# Patient Record
Sex: FEMALE | Race: WHITE | NOT HISPANIC OR LATINO | Employment: FULL TIME | URBAN - METROPOLITAN AREA
[De-identification: names, ages, dates, MRNs, and addresses within clinical notes are randomized per-mention and may not be internally consistent; named-entity substitution may affect disease eponyms.]

---

## 2019-06-24 ENCOUNTER — HOSPITAL ENCOUNTER (OUTPATIENT)
Dept: OTHER | Facility: HOSPITAL | Age: 44
Discharge: HOME | End: 2019-06-24
Attending: EMERGENCY MEDICINE

## 2019-06-24 LAB — CONV HIV-1/ HIV-2: NEGATIVE

## 2019-08-06 ENCOUNTER — HOSPITAL ENCOUNTER (OUTPATIENT)
Dept: OTHER | Facility: HOSPITAL | Age: 44
Discharge: HOME | End: 2019-08-06
Attending: EMERGENCY MEDICINE

## 2019-08-06 LAB — CONV HIV-1/ HIV-2: NEGATIVE

## 2019-09-13 ENCOUNTER — HOSPITAL ENCOUNTER (OUTPATIENT)
Dept: OTHER | Facility: HOSPITAL | Age: 44
Discharge: HOME | End: 2019-09-13

## 2019-09-13 ENCOUNTER — OFFICE VISIT CONVERTED (OUTPATIENT)
Dept: FAMILY MEDICINE CLINIC | Age: 44
End: 2019-09-13
Attending: NURSE PRACTITIONER

## 2019-09-13 LAB
ALBUMIN SERPL-MCNC: 4.4 G/DL (ref 3.5–5)
ALBUMIN/GLOB SERPL: 1.6 {RATIO} (ref 1.4–2.6)
ALP SERPL-CCNC: 96 U/L (ref 42–98)
ALT SERPL-CCNC: 10 U/L (ref 10–40)
ANION GAP SERPL CALC-SCNC: 17 MMOL/L (ref 8–19)
AST SERPL-CCNC: 13 U/L (ref 15–50)
BASOPHILS # BLD AUTO: 0.05 10*3/UL (ref 0–0.2)
BASOPHILS NFR BLD AUTO: 0.7 % (ref 0–3)
BILIRUB SERPL-MCNC: 0.25 MG/DL (ref 0.2–1.3)
BUN SERPL-MCNC: 7 MG/DL (ref 5–25)
BUN/CREAT SERPL: 8 {RATIO} (ref 6–20)
CALCIUM SERPL-MCNC: 8.7 MG/DL (ref 8.7–10.4)
CHLORIDE SERPL-SCNC: 105 MMOL/L (ref 99–111)
CHOLEST SERPL-MCNC: 100 MG/DL (ref 107–200)
CHOLEST/HDLC SERPL: 2.7 {RATIO} (ref 3–6)
CONV ABS IMM GRAN: 0.02 10*3/UL (ref 0–0.2)
CONV CO2: 23 MMOL/L (ref 22–32)
CONV IMMATURE GRAN: 0.3 % (ref 0–1.8)
CONV TOTAL PROTEIN: 7.2 G/DL (ref 6.3–8.2)
CREAT UR-MCNC: 0.85 MG/DL (ref 0.5–0.9)
DEPRECATED RDW RBC AUTO: 45.8 FL (ref 36.4–46.3)
EOSINOPHIL # BLD AUTO: 0.04 10*3/UL (ref 0–0.7)
EOSINOPHIL # BLD AUTO: 0.5 % (ref 0–7)
ERYTHROCYTE [DISTWIDTH] IN BLOOD BY AUTOMATED COUNT: 13.4 % (ref 11.7–14.4)
GFR SERPLBLD BASED ON 1.73 SQ M-ARVRAT: >60 ML/MIN/{1.73_M2}
GLOBULIN UR ELPH-MCNC: 2.8 G/DL (ref 2–3.5)
GLUCOSE SERPL-MCNC: 102 MG/DL (ref 65–99)
HCT VFR BLD AUTO: 41 % (ref 37–47)
HDLC SERPL-MCNC: 37 MG/DL (ref 40–60)
HGB BLD-MCNC: 13.9 G/DL (ref 12–16)
LDLC SERPL CALC-MCNC: 48 MG/DL (ref 70–100)
LYMPHOCYTES # BLD AUTO: 2.6 10*3/UL (ref 1–5)
LYMPHOCYTES NFR BLD AUTO: 35.7 % (ref 20–45)
MCH RBC QN AUTO: 31.8 PG (ref 27–31)
MCHC RBC AUTO-ENTMCNC: 33.9 G/DL (ref 33–37)
MCV RBC AUTO: 93.8 FL (ref 81–99)
MONOCYTES # BLD AUTO: 0.43 10*3/UL (ref 0.2–1.2)
MONOCYTES NFR BLD AUTO: 5.9 % (ref 3–10)
NEUTROPHILS # BLD AUTO: 4.15 10*3/UL (ref 2–8)
NEUTROPHILS NFR BLD AUTO: 56.9 % (ref 30–85)
NRBC CBCN: 0 % (ref 0–0.7)
OSMOLALITY SERPL CALC.SUM OF ELEC: 288 MOSM/KG (ref 273–304)
PLATELET # BLD AUTO: 336 10*3/UL (ref 130–400)
PMV BLD AUTO: 10.3 FL (ref 9.4–12.3)
POTASSIUM SERPL-SCNC: 4.9 MMOL/L (ref 3.5–5.3)
RBC # BLD AUTO: 4.37 10*6/UL (ref 4.2–5.4)
SODIUM SERPL-SCNC: 140 MMOL/L (ref 135–147)
T4 FREE SERPL-MCNC: 1.3 NG/DL (ref 0.9–1.8)
TRIGL SERPL-MCNC: 74 MG/DL (ref 40–150)
TSH SERPL-ACNC: 1.76 M[IU]/L (ref 0.27–4.2)
VLDLC SERPL-MCNC: 15 MG/DL (ref 5–37)
WBC # BLD AUTO: 7.29 10*3/UL (ref 4.8–10.8)

## 2019-11-12 ENCOUNTER — HOSPITAL ENCOUNTER (OUTPATIENT)
Dept: OTHER | Facility: HOSPITAL | Age: 44
Discharge: HOME | End: 2019-11-12
Attending: EMERGENCY MEDICINE

## 2019-11-14 LAB
CONV HIV COMBO AG/AB (HIV-1/O/2) WITH REFLEX: NEGATIVE
HCV AB SER DONR QL: <0.1 S/CO RATIO (ref 0–0.9)

## 2019-12-27 ENCOUNTER — OFFICE VISIT CONVERTED (OUTPATIENT)
Dept: FAMILY MEDICINE CLINIC | Age: 44
End: 2019-12-27
Attending: FAMILY MEDICINE

## 2020-02-13 ENCOUNTER — OFFICE VISIT CONVERTED (OUTPATIENT)
Dept: FAMILY MEDICINE CLINIC | Age: 45
End: 2020-02-13
Attending: NURSE PRACTITIONER

## 2020-02-13 ENCOUNTER — HOSPITAL ENCOUNTER (OUTPATIENT)
Dept: OTHER | Facility: HOSPITAL | Age: 45
Discharge: HOME | End: 2020-02-13

## 2020-02-13 LAB
ALBUMIN SERPL-MCNC: 4.3 G/DL (ref 3.5–5)
ALBUMIN/GLOB SERPL: 1.6 {RATIO} (ref 1.4–2.6)
ALP SERPL-CCNC: 106 U/L (ref 42–98)
ALT SERPL-CCNC: 22 U/L (ref 10–40)
AMYLASE SERPL-CCNC: 37 U/L (ref 30–110)
ANION GAP SERPL CALC-SCNC: 16 MMOL/L (ref 8–19)
APPEARANCE UR: CLEAR
AST SERPL-CCNC: 21 U/L (ref 15–50)
BASOPHILS # BLD AUTO: 0.06 10*3/UL (ref 0–0.2)
BASOPHILS NFR BLD AUTO: 0.9 % (ref 0–3)
BILIRUB SERPL-MCNC: 0.27 MG/DL (ref 0.2–1.3)
BILIRUB UR QL: NEGATIVE
BUN SERPL-MCNC: 9 MG/DL (ref 5–25)
BUN/CREAT SERPL: 12 {RATIO} (ref 6–20)
CALCIUM SERPL-MCNC: 9.2 MG/DL (ref 8.7–10.4)
CHLORIDE SERPL-SCNC: 101 MMOL/L (ref 99–111)
COLOR UR: YELLOW
CONV ABS IMM GRAN: 0.01 10*3/UL (ref 0–0.2)
CONV BACTERIA: ABNORMAL
CONV CO2: 24 MMOL/L (ref 22–32)
CONV COLLECTION SOURCE (UA): ABNORMAL
CONV HYALINE CASTS IN URINE MICRO: ABNORMAL /[LPF]
CONV IMMATURE GRAN: 0.1 % (ref 0–1.8)
CONV TOTAL PROTEIN: 7 G/DL (ref 6.3–8.2)
CONV UROBILINOGEN IN URINE BY AUTOMATED TEST STRIP: 0.2 {EHRLICHU}/DL (ref 0.1–1)
CREAT UR-MCNC: 0.76 MG/DL (ref 0.5–0.9)
DEPRECATED RDW RBC AUTO: 43.1 FL (ref 36.4–46.3)
EOSINOPHIL # BLD AUTO: 0.04 10*3/UL (ref 0–0.7)
EOSINOPHIL # BLD AUTO: 0.6 % (ref 0–7)
ERYTHROCYTE [DISTWIDTH] IN BLOOD BY AUTOMATED COUNT: 12.9 % (ref 11.7–14.4)
GFR SERPLBLD BASED ON 1.73 SQ M-ARVRAT: >60 ML/MIN/{1.73_M2}
GLOBULIN UR ELPH-MCNC: 2.7 G/DL (ref 2–3.5)
GLUCOSE SERPL-MCNC: 88 MG/DL (ref 65–99)
GLUCOSE UR QL: NEGATIVE MG/DL
HCT VFR BLD AUTO: 42.1 % (ref 37–47)
HGB BLD-MCNC: 14.3 G/DL (ref 12–16)
HGB UR QL STRIP: ABNORMAL
KETONES UR QL STRIP: NEGATIVE MG/DL
LEUKOCYTE ESTERASE UR QL STRIP: NEGATIVE
LIPASE SERPL-CCNC: 20 U/L (ref 5–51)
LYMPHOCYTES # BLD AUTO: 2.27 10*3/UL (ref 1–5)
LYMPHOCYTES NFR BLD AUTO: 32.2 % (ref 20–45)
MCH RBC QN AUTO: 31.2 PG (ref 27–31)
MCHC RBC AUTO-ENTMCNC: 34 G/DL (ref 33–37)
MCV RBC AUTO: 91.7 FL (ref 81–99)
MONOCYTES # BLD AUTO: 0.33 10*3/UL (ref 0.2–1.2)
MONOCYTES NFR BLD AUTO: 4.7 % (ref 3–10)
NEUTROPHILS # BLD AUTO: 4.34 10*3/UL (ref 2–8)
NEUTROPHILS NFR BLD AUTO: 61.5 % (ref 30–85)
NITRITE UR QL STRIP: NEGATIVE
NRBC CBCN: 0 % (ref 0–0.7)
OSMOLALITY SERPL CALC.SUM OF ELEC: 282 MOSM/KG (ref 273–304)
PH UR STRIP.AUTO: 6 [PH] (ref 5–8)
PLATELET # BLD AUTO: 309 10*3/UL (ref 130–400)
PMV BLD AUTO: 10.4 FL (ref 9.4–12.3)
POTASSIUM SERPL-SCNC: 3.8 MMOL/L (ref 3.5–5.3)
PROT UR QL: NEGATIVE MG/DL
RBC # BLD AUTO: 4.59 10*6/UL (ref 4.2–5.4)
RBC #/AREA URNS HPF: ABNORMAL /[HPF]
SODIUM SERPL-SCNC: 137 MMOL/L (ref 135–147)
SP GR UR: 1.01 (ref 1–1.03)
WBC # BLD AUTO: 7.05 10*3/UL (ref 4.8–10.8)
WBC #/AREA URNS HPF: ABNORMAL /[HPF]

## 2020-02-16 LAB
AMOXICILLIN+CLAV SUSC ISLT: 4
AMPICILLIN SUSC ISLT: >=32
AMPICILLIN+SULBAC SUSC ISLT: 8
BACTERIA UR CULT: ABNORMAL
CEFAZOLIN SUSC ISLT: <=4
CEFEPIME SUSC ISLT: <=1
CEFTAZIDIME SUSC ISLT: <=1
CEFTRIAXONE SUSC ISLT: <=1
CEFUROXIME ORAL SUSC ISLT: <=1
CEFUROXIME PARENTER SUSC ISLT: <=1
CIPROFLOXACIN SUSC ISLT: <=0.25
ERTAPENEM SUSC ISLT: <=0.5
GENTAMICIN SUSC ISLT: <=1
LEVOFLOXACIN SUSC ISLT: <=0.12
NITROFURANTOIN SUSC ISLT: <=16
TETRACYCLINE SUSC ISLT: <=1
TMP SMX SUSC ISLT: <=20
TOBRAMYCIN SUSC ISLT: <=1

## 2020-02-26 ENCOUNTER — OFFICE VISIT CONVERTED (OUTPATIENT)
Dept: CARDIOLOGY | Facility: CLINIC | Age: 45
End: 2020-02-26
Attending: INTERNAL MEDICINE

## 2020-03-04 ENCOUNTER — CONVERSION ENCOUNTER (OUTPATIENT)
Dept: CARDIOLOGY | Facility: CLINIC | Age: 45
End: 2020-03-04
Attending: INTERNAL MEDICINE

## 2020-03-31 ENCOUNTER — OFFICE VISIT CONVERTED (OUTPATIENT)
Dept: FAMILY MEDICINE CLINIC | Age: 45
End: 2020-03-31
Attending: FAMILY MEDICINE

## 2021-02-04 ENCOUNTER — HOSPITAL ENCOUNTER (OUTPATIENT)
Dept: VACCINE CLINIC | Facility: HOSPITAL | Age: 46
Discharge: HOME | End: 2021-02-04
Attending: INTERNAL MEDICINE

## 2021-03-04 ENCOUNTER — HOSPITAL ENCOUNTER (OUTPATIENT)
Dept: VACCINE CLINIC | Facility: HOSPITAL | Age: 46
Discharge: HOME | End: 2021-03-04
Attending: INTERNAL MEDICINE

## 2021-05-15 VITALS
SYSTOLIC BLOOD PRESSURE: 111 MMHG | DIASTOLIC BLOOD PRESSURE: 73 MMHG | WEIGHT: 158 LBS | HEIGHT: 68 IN | HEART RATE: 89 BPM | BODY MASS INDEX: 23.95 KG/M2

## 2021-05-18 NOTE — PROGRESS NOTES
Rossi Osullivan  1975     Office/Outpatient Visit    Visit Date:  03:12 pm    Provider: Rosetta Torres N.P. (Assistant: Eliza Ya MA)    Location: [facility]        Electronically signed by Rosetta Torres N.P. on  2020 05:18:31 PM                             Subjective:        CC: Kelly is a 44 year old White female.  Patient presents today with complaints of LLQ abdominal pain X 3 days;         HPI: 44-year-old female presenting to clinic with left lower quadrant pain x3 days.  She states that she was diagnosed with flu at the beginning of this month and had nausea, vomiting and diarrhea.  She states that her symptoms from flu subsided, but then she started to experience the left lower quadrant sharp shooting pain.  She also went 5 days Without having a bowel movement.  Last night she tried an enema and it was successful. She states that the pain is still there however. She has not noticed any blood in her stool or urine.  No dysuria.    ROS:     CONSTITUTIONAL:  Negative for fatigue and fever.      CARDIOVASCULAR:  Negative for chest pain.      RESPIRATORY:  Negative for dyspnea.      GASTROINTESTINAL:  Positive for abdominal pain ( LLQ ) and nausea.   Negative for constipation, diarrhea, hematemesis, hematochezia, hemorrhoids, melena or vomiting.      GENITOURINARY:  Negative for dysuria and urinary incontinence.      MUSCULOSKELETAL:  Negative for myalgias.      NEUROLOGICAL:  Negative for dizziness and weakness.          Past Medical History / Family History / Social History:         Last Reviewed on 2020 05:13 PM by Rosetta Torres    Past Medical History:         Systemic Lupus Erythematosus         Surgical History:         Arthroscopy: L Knee;     Cholecystectomy     section    Dilation and Curettage     Thyroidectomy: Partial; Procedures: EGD - four times     Positive for    Hysterectomy: -; still has right ovary; ;         Family  History:         Positive for Aortic Aneurysm ( mother ).      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          Substance Abuse History:     Last Reviewed on 2/13/2020 05:13 PM by Rosetta Torres    NEGATIVE         Mental Health History:     Last Reviewed on 2/13/2020 05:13 PM by Rosetta Torres        Communicable Diseases (eg STDs):     Last Reviewed on 12/27/2019 06:54 PM by Danie Faye        Immunizations:     Fluzone (3 + years dose) 11/25/2008    Fluzone (3 + years dose) 10/1/2016        Allergies:     Last Reviewed on 2/13/2020 05:13 PM by Rosetta Torres    Latex:      Zinc Oxide:      Vancomycin HCl:      Cinnamon:      Penicillins: swelling     Reglan:          Current Medications:     Last Reviewed on 2/13/2020 05:13 PM by Rosetta Torres    Advil     Tylenol Sinus Maximum Strength     levothyroxine 50 mcg oral tablet [1 tab daily ]    Metoprolol Succinate 100 mg oral Tablet, Extended Release 24 hr [Take 1/2 tablet(s) by mouth bid]    geritrol multivitamin         Objective:        Vitals:         Current: 2/13/2020 3:16:58 PM    Ht:  5 ft, 8.5 in;  Wt: 158.4 lbs;  BMI: 23.7T: 98.4 F (oral);  BP: 114/49 mm Hg (left arm, sitting);  P: 83 bpm (left arm (BP Cuff), sitting);  sCr: 0.85 mg/dL;  GFR: 90.66        Exams:     PHYSICAL EXAM:     GENERAL: vital signs recorded - well developed, well nourished;  well groomed;  no apparent distress, appears minimally ill;     E/N/T:     RESPIRATORY: normal respiratory rate and pattern with no distress; normal breath sounds with no rales, rhonchi, wheezes or rubs;     CARDIOVASCULAR: normal rate; rhythm is regular;   no systolic murmur; no edema;     GASTROINTESTINAL: moderate LLQ pain;  voluntary guarding;  normal bowel sounds;     LYMPHATIC: no enlargement of cervical or facial nodes;     MUSCULOSKELETAL: normal gait; normal overall tone     NEUROLOGIC: mental status: alert and oriented x 3;         Assessment:         Z13.31   Encounter for screening for depression       R10.32   Left lower quadrant pain       R11.0   Nausea           ORDERS:         Meds Prescribed:       [New Rx] ondansetron 4 mg oral Tablet,disintegrating [place 1 tablets (4 mg) on top of the tongue where they will dissolve every 6 hours as needed for nausea], #30 (thirty) tablets, Refills: 0 (zero)         Radiology/Test Orders:       71417  CT, abdomen; with IV contrast; prefer oral prep  (Send-Out)              Lab Orders:       [id number]  AMYS - [facility] Amylase, Serum  (Send-Out)            27519  BDCBC - [facility] CBC with 3 part diff  (Send-Out)            82470  COMP - [facility] Comp. Metabolic Panel  (Send-Out)            29343  LIP - [facility] Lipase, Serum  (Send-Out)            17348  BDUAM - [facility] Urinalysis, automated, with micro  (Send-Out)              Other Orders:         Depression screen positive and follow up plan documented  (In-House)                      Plan:         Encounter for screening for depression    MIPS PHQ-9 Depression Screening: Completed form scanned and in chart; Total Score 8 Positive Depression Screen: Pt is to f/u with [facility] as scheduled.            Orders:         Depression screen positive and follow up plan documented  (In-House)              Left lower quadrant painPatient to have a clear liquid diet and advance as tolerated.  Zofran as needed for nausea.  Acute abdominal labs to be drawn today as well as a UA.  CT of abdomen ordered. Patient to go to emergency department with any worsening abdominal pain.  Will notify patient of results.  Follow-up as needed.  Patient verbalizes understanding has no further questions upon  discharge.    LABORATORY:  Labs ordered to be performed today include amylase, CBC, Comprehensive metabolic panel, Lipase, and urinalysis with micro.      RADIOLOGY:  I have ordered CT of Abdomen with IV contrast; oral prep to be done today.            Orders:       [id number]  AMYS - [facility] Amylase, Serum  (Send-Out)            69027  BDCBC - [facility] CBC with 3 part diff  (Send-Out)            66601  COMP - [facility] Comp. Metabolic Panel  (Send-Out)            72947  LIP - [facility] Lipase, Serum  (Send-Out)            05796  CT, abdomen; with IV contrast; prefer oral prep  (Send-Out)            40170  BDUAM - [facility] Urinalysis, automated, with micro  (Send-Out)              Nausea          Prescriptions:       [New Rx] ondansetron 4 mg oral Tablet,disintegrating [place 1 tablets (4 mg) on top of the tongue where they will dissolve every 6 hours as needed for nausea], #30 (thirty) tablets, Refills: 0 (zero)             Charge Capture:         Primary Diagnosis:     Z13.31  Encounter for screening for depression           Orders:      [id number]  Office/outpatient visit; established patient, level 4  (In-House)              Depression screen positive and follow up plan documented  (In-House)              R10.32  Left lower quadrant pain     R11.0  Nausea

## 2021-05-18 NOTE — PROGRESS NOTES
Rossi Osullivan  1975     Office/Outpatient Visit    Visit Date: Tue, Mar 31, 2020 03:40 pm    Provider: Danie Faye MD (Assistant: Tiffany Zamudio MA)    Location: [facility]        Electronically signed by Danie Faye MD on  03/31/2020 05:19:06 PM                             Subjective:        CC: TELEMEDICINE VISIT:    - Patient consented to this telemedicine visit. Consent obtained by Tiffany Zamudio MA and Danie Faye MD.    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  This is a follow-up visit.  check up;         HPI:           Patient presents with hypothyroidism, unspecified.  She is currently taking Synthroid, 50 mcg daily.  The result was reported as normal ( 1.760 on 9/13/19 mU/L ).  She denies any related symptoms.  She reports no symptoms suggestive of adverse medication effect.        At last visit, patient was given a short course of Klonopin 0.5 mg twice daily for poorly controlled anxiety.  She has a longstanding history of anxiety and PTSD.  She was asked to establish with a psychiatrist as her previous psychiatrist had moved his practice and a referral was placed.  She has not yet done so.  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  She still has supplies left of her Klonopin.  Of note, she has tried multiple antidepressants in the past and always had some sort of side effect to these medications.  Her mood is stable.  No SI or HI.      Patient has a longstanding history of supraventricular tachycardia and palpitations.  At last visit, she said that her symptoms have been worsening and a referral was placed to cardiology since her previous cardiologist had moved out of the area.  She has since seen cardiology who ordered an echocardiogram which has not yet been  performed. Otherwise, they made no changes to her management.  Her current regimen includes Toprol-XL 50 mg twice daily.  She says she continues to have intermittent palpitations but these are stable and may be even slightly better than last time she was seen in our clinic.  Today, she is asymptomatic.    ROS:     CONSTITUTIONAL:  Negative for chills, fatigue and fever.      CARDIOVASCULAR:  Positive for palpitations.   Negative for chest pain, dizziness or edema.      RESPIRATORY:  Negative for dyspnea and cough.      GASTROINTESTINAL:  Negative for abdominal pain, diarrhea, heartburn, nausea and vomiting.      NEUROLOGICAL:  Negative for headaches, paresthesias and weakness.      ENDOCRINE:  Negative for hair loss, heat/cold intolerance, polydipsia, and polyphagia.      PSYCHIATRIC:  Positive for anxiety and feelings of stress.   Negative for depression, sleep disturbance or suicidal thoughts.          Past Medical History / Family History / Social History:         Last Reviewed on 3/31/2020 05:18 PM by Danie Faye    Past Medical History:         Systemic Lupus Erythematosus         Surgical History:         Arthroscopy: L Knee;     Cholecystectomy     section    Dilation and Curettage     Thyroidectomy: Partial; Procedures: EGD - four times     Positive for    Hysterectomy: -; still has right ovary; ;         Family History:         Positive for Aortic Aneurysm ( mother ).      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          Substance Abuse History:     Last Reviewed on 3/31/2020 05:18 PM by Danie Faye    NEGATIVE         Mental Health History:     Last  Reviewed on 3/31/2020 05:18 PM by Danie Faye        Communicable Diseases (eg STDs):     Last Reviewed on 3/31/2020 05:18 PM by Danie Faye        Current Problems:     Last Reviewed on 3/31/2020 05:18 PM by Danie Faye    Systemic lupus erythematosus    Low back pain    Syncope    Syncope and collapse    Mitral valve prolapse    Tricuspid valve regurgitation    Encounter for screening for lipoid disorders    Hypothyroidism    Screening for lipoid disorders    Hypothyroidism, unspecified    Generalized anxiety disorder    Supraventricular tachycardia    Left lower quadrant pain    Nausea    Encounter for screening for depression        Immunizations:     Fluzone (3 + years dose) 11/25/2008    Fluzone (3 + years dose) 10/1/2016        Allergies:     Last Reviewed on 3/31/2020 05:18 PM by Danie Faye    Latex:      Zinc Oxide:      Vancomycin HCl:      Cinnamon:      Penicillins: swelling     Reglan:          Current Medications:     Last Reviewed on 3/31/2020 05:18 PM by Danie Faye    Advil     Tylenol Sinus Maximum Strength     Metoprolol Succinate 100 mg oral Tablet, Extended Release 24 hr [Take 1/2 tablet(s) by mouth bid]    levothyroxine 50 mcg oral tablet [1 tab daily ]    geritrol multivitamin     ondansetron 4 mg oral Tablet,disintegrating [place 1 tablets (4 mg) on top of the tongue where they will dissolve every 6 hours as needed for nausea]    hydroxychloroquine 200 mg oral tablet [take 1 tablet (200 mg) by oral route 2 times per day]        Assessment:         E03.9   Hypothyroidism, unspecified       F41.1   Generalized anxiety disorder       I47.1   Supraventricular tachycardia           Plan:         Hypothyroidism, unspecified- Stable.  Continue Synthroid 50 mcg daily.    Telehealth: Verbal consent obtained for visit to occur via phone call; Total time spent was 7 minutes; 55340--Codsopffw E/M 5-10 minutes         Generalized anxiety disorder- Stable.  Continue Klonopin 0.5 mg twice daily  as needed.        Supraventricular tachycardia- Stable.  Continue Toprol-XL 50 mg twice daily.  Continue to follow with cardiology as directed.            Charge Capture:         Primary Diagnosis:     E03.9  Hypothyroidism, unspecified           Orders:      21670  Phys/QHP telephone evaluation 5-10 min  (In-House)              F41.1  Generalized anxiety disorder     I47.1  Supraventricular tachycardia

## 2021-05-18 NOTE — PROGRESS NOTES
Rossi Osullivan  1975     Office/Outpatient Visit    Visit Date: Fri, Dec 27, 2019 01:53 pm    Provider: Danie Faye MD (Assistant: Tiffany Zamudio MA)    Location: [facility]        Electronically signed by Danie Faye MD on  12/27/2019 06:55:02 PM                             Subjective:        CC: (NOT CURRENTLY TAKING METOPROLOL, CHANTIX, ESTRADIOL, CLONAZEPAM) Kelly is a 44 year old White female.  This is a follow-up visit.  establishment, medication refills;         HPI:           Kelly presents with hypothyroidism, unspecified.  She is currently taking Synthroid, 50 mcg daily.  The result was reported as normal.  She denies any related symptoms.  She reports no symptoms suggestive of adverse medication effect.        Patient has a longstanding history of supraventricular tachycardia that has recently been well controlled.  Up until approximately 1 month ago, she followed with a cardiac who has since moved his practice out of the area.  She is requesting referral to a cardiologist here at [facility].  Her current regimen includes Toprol-XL 50 mg twice daily. She is currently asymptomatic      She also reports a longstanding history of anxiety and PTSD.  She says she up until recently and followed with a psychiatrist who has also moved his practice.  She says that her drive is now too far.  She is looking for another psychiatrist who is closer to home.  She says he has been tried on multiple antidepressants in the past and always had some side effects to these medications.  Her current regimen includes only Klonopin 0.5 mg twice daily as needed.  She says she has been out of this and feels as though her anxiety has been out of control recently.  She finds her self worrying incessantly and  constantly on edge.  When she has her medication, she says that she still has intermittent anxiety symptoms but they are much better than they are now.  No SI or HI.     ROS:     CONSTITUTIONAL:   Negative for chills, fatigue and fever.      EYES:  Negative for blurred vision.      E/N/T:  Negative for ear pain and tinnitus.      CARDIOVASCULAR:  Positive for palpitations.   Negative for chest pain, dizziness or edema.      RESPIRATORY:  Negative for dyspnea and cough.      GASTROINTESTINAL:  Negative for abdominal pain, diarrhea, heartburn, nausea and vomiting.      GENITOURINARY:  Negative for dysuria, hematuria and polyuria.      MUSCULOSKELETAL:  Negative for arthralgias and myalgias.      INTEGUMENTARY/BREAST:  Negative for rash, breast mass and skin changes of breast.      NEUROLOGICAL:  Negative for headaches, paresthesias and weakness.      ENDOCRINE:  Negative for hair loss, heat/cold intolerance, polydipsia, and polyphagia.      PSYCHIATRIC:  Positive for anxiety and feelings of stress.   Negative for depression, sleep disturbance or suicidal thoughts.          Past Medical History / Family History / Social History:         Last Reviewed on 2019 06:54 PM by Danie Faye    Past Medical History:         Systemic Lupus Erythematosus         Surgical History:         Arthroscopy: L Knee;     Cholecystectomy     section    Dilation and Curettage     Thyroidectomy: Partial; Procedures: EGD - four times     Positive for    Hysterectomy: 6-; still has right ovary; ;         Family History:         Positive for Aortic Aneurysm ( mother ).      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          Substance Abuse History:     Last Reviewed on 2019 06:54 PM by Danie Faye    NEGATIVE         Mental Health History:     Last Reviewed on 2019 06:54  PM by Danie Faye        Communicable Diseases (eg STDs):     Last Reviewed on 12/27/2019 06:54 PM by Danie Faye        Current Problems:     Last Reviewed on 12/27/2019 06:54 PM by Danie Faye    Systemic lupus erythematosus    Low back pain    Syncope    Syncope and collapse    Mitral valve prolapse    Tricuspid valve regurgitation    Encounter for screening for lipoid disorders    Hypothyroidism    Screening for lipoid disorders    Hypothyroidism, unspecified    Generalized anxiety disorder    Supraventricular tachycardia        Immunizations:     Fluzone (3 + years dose) 11/25/2008    Fluzone (3 + years dose) 10/1/2016        Allergies:     Last Reviewed on 12/27/2019 06:54 PM by Danie Faye    Latex:      Zinc Oxide:      Vancomycin HCl:      Cinnamon:      Penicillins: swelling     Reglan:          Current Medications:     Last Reviewed on 12/27/2019 06:54 PM by Danie Faye    Plaquenil 200 mg oral tablet [1 po BID]    Advil     Tylenol Sinus Maximum Strength     Clonazepam 0.5 mg oral tablet [1 bid  prn]    levothyroxine 50 mcg oral tablet [1 tab daily ]    Metoprolol Succinate 100 mg oral Tablet, Extended Release 24 hr [Take 1/2 tablet(s) by mouth bid]    Chantix 0.5 mg oral tablet    Estradiol 0.1 mg/24 hr Transdermal Patch, Transdermal Weekly    geritrol multivitamin         Objective:        Vitals:         Current: 12/27/2019 1:59:47 PM    Ht:  5 ft, 8.5 in;  Wt: 163 lbs;  BMI: 24.4T: 98.7 F (oral);  BP: 109/59 mm Hg (left arm, sitting);  P: 84 bpm (left arm (BP Cuff), sitting);  sCr: 0.85 mg/dL;  GFR: 91.77        Exams:     PHYSICAL EXAM:     GENERAL: vital signs recorded - well developed, well nourished;  no apparent distress;     EYES: conjunctiva and cornea are normal;     E/N/T:  normal EACs, TMs, nasal/oral mucosa, teeth, gingiva, and oropharynx;     NECK: trachea is midline; thyroid exam reveals s/p left kathi-thyroidectomy; Right side normal;     RESPIRATORY: Clear to auscultation  "bilaterally; no rales (\"crackles\") present; no rhonchi; no wheezes;     CARDIOVASCULAR: normal rate; rhythm is regular;  No murmurs. clicks, gallops or rubs appreciated; no edema;     GASTROINTESTINAL: nontender; Soft and nondistended; normal bowel sounds; no organomegaly; no masses;     LYMPHATIC: no enlargement of cervical or facial nodes; no supraclavicular nodes;     BREAST/INTEGUMENT: No significant rashes, lesions or suspicious moles within limits of examination;     MUSCULOSKELETAL: muscle strength: 5/5 in all major muscle groups;  normal overall tone     NEUROLOGIC: Grossly intact; mental status: alert and oriented x 3;     PSYCHIATRIC: appropriate affect and demeanor; normal speech pattern; Normal behavior;         Lab/Test Results:         Urine temperature: confirmed (12/27/2019),     All urine drug screen levels confirmed negative: yes (12/27/2019),     Date and time of last pill: clonazepam 10/26/19 (12/27/2019),     Performed by: atc (12/27/2019),     Collection Time: 1445 (12/27/2019),             Assessment:         E03.9   Hypothyroidism, unspecified       I47.1   Supraventricular tachycardia       F41.1   Generalized anxiety disorder           ORDERS:         Meds Prescribed:       [Refilled] levothyroxine 50 mcg oral tablet [1 tab daily ], #30 (thirty) tablets, Refills: 11 (eleven)       [Refilled] Metoprolol Succinate 100 mg oral Tablet, Extended Release 24 hr [Take 1/2 tablet(s) by mouth bid], #30 (thirty) tablets, Refills: 11 (eleven)       [Refilled] clonazePAM 0.5 mg oral tablet [1 bid  prn], #60 (sixty) tablets, Refills: 0 (zero)         Lab Orders:       [id number]  Drug test prsmv qual dir optical obs per day  (In-House)              Procedures Ordered:       REFER  Referral to Specialist or Other Facility  (Send-Out)            REFER  Referral to Specialist or Other Facility  (Send-Out)                      Plan:         Hypothyroidism, unspecified- Stable.  Continue Synthroid 50 mcg daily.  " Refill provided today.          Prescriptions:       [Refilled] levothyroxine 50 mcg oral tablet [1 tab daily ], #30 (thirty) tablets, Refills: 11 (eleven)         Supraventricular tachycardia- Stable.  Continue Toprol-XL 50 mg twice daily.  Refill provided today.  Referral placed to cardiology today        REFERRALS:  Referral initiated to a cardiologist ( Dr. Jose Castillo, [facility] ).            Prescriptions:       [Refilled] Metoprolol Succinate 100 mg oral Tablet, Extended Release 24 hr [Take 1/2 tablet(s) by mouth bid], #30 (thirty) tablets, Refills: 11 (eleven)           Orders:       REFER  Referral to Specialist or Other Facility  (Send-Out)              Generalized anxiety disorder- Not currently controlled.  Will restart Klonopin 0.5 mg twice daily as needed. Will refer to psychiatry.  I have indicated the patient that it is not my desire to manage this controlled substance over the long-term and I would prefer that she establish with a specialist who can provide her with appropriate medications as needed.    LABORATORY:  Labs ordered to be performed today include Drug screen.      REFERRALS:  Referral initiated to a psychiatrist.  Controlled substance documentation: Humberto reviewed; drug screen performed and appropriate; consent is reviewed and signed and on the chart.  She is aware of risk of addiction on this medication, understands that she will need to follow up for a review every 3 months and her medications will be adjusted or decreased as deemed appropriate at each visit.  No history of drug or alcohol abuse.  No concerns about diversion or abuse. She denies side effects related to the medication.  She is aware that she may be called in for pill counts.  The dosing of this medication will be reviewed on a regular basis and reduced if possible..  Ongoing use of a controlled substance is necessary for this patient to have a normal quality of life           Prescriptions:        [Refilled] clonazePAM 0.5 mg oral tablet [1 bid  prn], #60 (sixty) tablets, Refills: 0 (zero)           Orders:       [id number]  Drug test prsmv qual dir optical obs per day  (In-House)            REFER  Referral to Specialist or Other Facility  (Send-Out)                  Charge Capture:         Primary Diagnosis:     E03.9  Hypothyroidism, unspecified           Orders:      [id number]  Office/outpatient visit; established patient, level 4  (In-House)              I47.1  Supraventricular tachycardia     F41.1  Generalized anxiety disorder           Orders:      22702  Drug test prsmv qual dir optical obs per day  (In-House)

## 2021-05-18 NOTE — PROGRESS NOTES
Rossi Osullivan 1975     Office/Outpatient Visit    Visit Date: Fri, Sep 13, 2019 04:08 pm    Provider: Maddi Canales N.P. (Assistant: Nadir Orellana)    Location: [facility]        Electronically signed by Maddi Canales N.P. on  09/28/2019 10:51:41 AM                             SUBJECTIVE:        CC:     Kelly is a 43 year old White female.  physical and thyroid check, has been very stressed, possible sinus infection; (NOT TAKING PLAQUENIL, HAD FLU SHOT A FEW DAYS AGO)         HPI:         Kelly presents with health checkup.  She cannot recall when she last had a physical exam.  She is status-post hysterectomy.  She is not currently using any form of contraception.  She performs breast self-exams every every time she showers weeks.    Her last Pap smear was <6 months ago and was abnormal, but no details are known.   Her last mammogram was 2 years ago.   She has never had a dexa scan. She underwent colonoscopy in 2007.   She's had vision screening done 3 years ago.   Preventative Health updated today.  She is current with her Td and influenza immunization.  Kelly has had an abnormal Pap smear in the past.  Tobacco: Current Smoker: She currently smokes every day, 2 packs per day.          PHQ-9 Depression Screening: Completed form scanned and in chart; Total Score 9         In regard to the headache, pt states headaches, sinus pressure, sore throat x 4-5 days. States taking otc meds with little relief.          With regard to the hypothyroidism, pt states doing well on meds. Here for f/u and refills.      ROS:     CONSTITUTIONAL:  Negative for chills, fatigue, fever, and weight change.      EYES:  Negative for blurred vision.      CARDIOVASCULAR:  Negative for chest pain, orthopnea, paroxysmal nocturnal dyspnea and pedal edema.      RESPIRATORY:  Negative for dyspnea.      GASTROINTESTINAL:  Negative for abdominal pain, constipation, diarrhea, nausea and vomiting.      NEUROLOGICAL:   Negative for dizziness, headaches, paresthesias, and weakness.      PSYCHIATRIC:  Negative for anxiety, depression, and sleep disturbances.          PMH/FMH/SH:     Last Reviewed on 2017 10:08 AM by Maddi Canales    Past Medical History:         Systemic Lupus Erythematosus         Surgical History:         Arthroscopy: L Knee;     Cholecystectomy     section    Dilation and Curettage      Thyroidectomy: Partial; Procedures: EGD - four times     Positive for    Hysterectomy: 6-; still has right ovary; ;         Family History:         Positive for Aortic Aneurysm ( mother ).      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          Alcohol:    Drinks alcohol very infrequently.          Substance Abuse History:     Last Reviewed on 2017 10:08 AM by Maddi Canales    NEGATIVE         Mental Health History:     Last Reviewed on 2017 10:08 AM by Maddi Canales        Communicable Diseases (eg STDs):     Last Reviewed on 2017 10:08 AM by Maddi Canales            Current Problems:     Last Reviewed on 2017 10:08 AM by Maddi Canales    Screening for hyperlipidemia     Syncope     Mitral valve prolapse     Tricuspid valve regurgitation     Foot pain     Low back pain     Systemic lupus erythematosus     Loss of weight         Immunizations:     Fluzone (3 + years dose) 2008     Fluzone (3 + years dose) 10/1/2016         Allergies:     Last Reviewed on 2017 10:08 AM by Maddi Canales    Zinc Oxide:    Penicillins: swelling    Reglan:        Current Medications:     Last Reviewed on 2017 10:08 AM by Maddi Canales    Metoprolol Succinate 100mg  Tablets, Extended Release Take 1/2 tablet(s) by mouth bid     Potassium Gluconate 595mg Tablet Take 1 tablet(s) by mouth daily     Levothyroxine Sodium 0.05mg Tablet 1 tab daily     Advil     Tylenol Sinus Maximum Strength     Plaquenil Sulfate 200mg Tablet 1 po BID         OBJECTIVE:        Vitals:         Current: 9/13/2019 4:18:12 PM    Ht:  5 ft, 8.5 in;  Wt: 167 lbs;  BMI: 25.0    T: 99.2 F (oral);  BP: 99/59 mm Hg (left arm, sitting);  P: 96 bpm (left arm (BP Cuff), sitting);  sCr: 0.79 mg/dL;  GFR: 100.77        Exams:     PHYSICAL EXAM:     GENERAL: vital signs recorded - well developed, well nourished;  no apparent distress;     EYES: extraocular movements intact; conjunctiva and cornea are normal; PERRLA;     NECK: range of motion is normal; thyroid is non-palpable;     RESPIRATORY: normal respiratory rate and pattern with no distress; normal breath sounds with no rales, rhonchi, wheezes or rubs;     CARDIOVASCULAR: normal rate; rhythm is regular;  no systolic murmur; no edema;     GASTROINTESTINAL: nontender; normal bowel sounds; no organomegaly;     NEUROLOGICAL:  cranial nerves, motor and sensory function, reflexes, gait and coordination are all intact;     PSYCHIATRIC:  appropriate affect and demeanor; normal speech pattern; grossly normal memory;         ASSESSMENT:           V70.0   Z00.00  Health checkup              DDx:     V79.0   Z13.31  Screening for depression              DDx:     784.0   R51  Headache              DDx:     244.9   E03.9  Hypothyroidism              DDx:     V77.91   Z13.220  Screening for lipoid disorders              DDx:         ORDERS:         Meds Prescribed:       Bactrim DS (Trimethoprim/Sulfamethoxazole ) Tablet Take 1 tablet(s) by mouth q12h for 10 days  #20 (Twenty) tablet(s) Refills: 0       Bromfed-DM (Brompheniramine/Dextromethorphan/Pseudoephedrine) Syrup 1  to 2  tsp po every 4-6 hrs prn cough/congestion.  #240 ([address] [place] [address] [place]) ml Refills: 0          Radiology/Test Orders:       3014F  Screening mammography results documented and reviewed (PV)1  (In-House)           Lab Orders:       38894  THYII - [place] Thyroid panel with TSH (40695, 00306)  (Send-Out)         61138  LPDP - [place] Lipid Panel  (Send-Out)         66373  BDCBC - [place] CBC with 3 part diff  (Send-Out)         66020  COMP - [place] Comp. Metabolic Panel  (Send-Out)           Other Orders:         Depression screen negative  (In-House)                   PLAN:          Health checkup     MIPS Negative Depression Screen           Orders:         Depression screen negative  (In-House)         3014F  Screening mammography results documented and reviewed (PV)1  (In-House)            Headache           Prescriptions:       Bactrim DS (Trimethoprim/Sulfamethoxazole ) Tablet Take 1 tablet(s) by mouth q12h for 10 days  #20 (Twenty) tablet(s) Refills: 0       Bromfed-DM (Brompheniramine/Dextromethorphan/Pseudoephedrine) Syrup 1  to 2  tsp po every 4-6 hrs prn cough/congestion.  #240 ([address] [place] [address]) ml Refills: 0          Hypothyroidism     LABORATORY:  Labs ordered to be performed today include CBC, Comprehensive metabolic panel, lipid panel, and Thyroid Panel.            Orders:       46405  THYII - [place] Thyroid panel with TSH (68102, 27125)  (Send-Out)         20016  BDCBC - [place] CBC with 3 part diff  (Send-Out)         13108  COMP - [place] Comp. Metabolic Panel  (Send-Out)            Screening for lipoid disorders     LABORATORY:  Labs ordered to be performed today include lipid panel.            Orders:       71456  LPDP - [place] Lipid Panel  (Send-Out)               CHARGE CAPTURE:           Primary Diagnosis:     V70.0 Health checkup            Z00.00    Encounter for general adult medical examination without abnormal findings              Orders:          90826   Preventive medicine, established patient, age 40-64 years  (In-House)                Depression screen negative  (In-House)              3014F   Screening mammography results documented and reviewed (PV)1  (In-House)           V79.0 Screening for depression            Z13.31    Encounter for screening for depression              Orders:          59526 -25  Office/outpatient visit; established patient, level 3  (In-House)           784.0 Headache            R51    Headache    244.9 Hypothyroidism            E03.9    Hypothyroidism, unspecified    V77.91 Screening for lipoid disorders            Z13.220    Encounter for screening for lipoid disorders

## 2021-07-01 VITALS
WEIGHT: 167 LBS | BODY MASS INDEX: 24.73 KG/M2 | HEIGHT: 69 IN | TEMPERATURE: 99.2 F | DIASTOLIC BLOOD PRESSURE: 59 MMHG | HEART RATE: 96 BPM | SYSTOLIC BLOOD PRESSURE: 99 MMHG

## 2021-07-02 VITALS
SYSTOLIC BLOOD PRESSURE: 109 MMHG | TEMPERATURE: 98.7 F | HEIGHT: 69 IN | WEIGHT: 163 LBS | BODY MASS INDEX: 24.14 KG/M2 | DIASTOLIC BLOOD PRESSURE: 59 MMHG | HEART RATE: 84 BPM

## 2021-07-02 VITALS
BODY MASS INDEX: 23.46 KG/M2 | HEART RATE: 83 BPM | TEMPERATURE: 98.4 F | DIASTOLIC BLOOD PRESSURE: 49 MMHG | WEIGHT: 158.4 LBS | SYSTOLIC BLOOD PRESSURE: 114 MMHG | HEIGHT: 69 IN

## 2021-07-09 ENCOUNTER — LAB (OUTPATIENT)
Dept: LAB | Facility: HOSPITAL | Age: 46
End: 2021-07-09

## 2021-07-09 ENCOUNTER — OFFICE VISIT (OUTPATIENT)
Dept: FAMILY MEDICINE CLINIC | Age: 46
End: 2021-07-09

## 2021-07-09 VITALS
WEIGHT: 180.8 LBS | TEMPERATURE: 98.2 F | HEART RATE: 97 BPM | BODY MASS INDEX: 27.4 KG/M2 | HEIGHT: 68 IN | SYSTOLIC BLOOD PRESSURE: 122 MMHG | DIASTOLIC BLOOD PRESSURE: 76 MMHG | OXYGEN SATURATION: 97 %

## 2021-07-09 DIAGNOSIS — I47.1 SUPRAVENTRICULAR TACHYCARDIA (HCC): ICD-10-CM

## 2021-07-09 DIAGNOSIS — E03.9 HYPOTHYROIDISM, UNSPECIFIED TYPE: ICD-10-CM

## 2021-07-09 DIAGNOSIS — E03.9 HYPOTHYROIDISM, UNSPECIFIED TYPE: Primary | ICD-10-CM

## 2021-07-09 DIAGNOSIS — M32.9 SYSTEMIC LUPUS ERYTHEMATOSUS, UNSPECIFIED SLE TYPE, UNSPECIFIED ORGAN INVOLVEMENT STATUS (HCC): ICD-10-CM

## 2021-07-09 DIAGNOSIS — Z12.31 ENCOUNTER FOR SCREENING MAMMOGRAM FOR MALIGNANT NEOPLASM OF BREAST: ICD-10-CM

## 2021-07-09 DIAGNOSIS — F41.1 GENERALIZED ANXIETY DISORDER: ICD-10-CM

## 2021-07-09 DIAGNOSIS — E04.1 THYROID NODULE: ICD-10-CM

## 2021-07-09 PROBLEM — G90.9 UNSPECIFIED DISORDER OF AUTONOMIC NERVOUS SYSTEM: Status: ACTIVE | Noted: 2017-07-12

## 2021-07-09 PROBLEM — I34.1 MITRAL VALVE PROLAPSE: Status: ACTIVE | Noted: 2021-07-09

## 2021-07-09 PROBLEM — I47.10 SUPRAVENTRICULAR TACHYCARDIA: Status: ACTIVE | Noted: 2021-07-09

## 2021-07-09 PROBLEM — E87.6 HYPOKALEMIA: Status: ACTIVE | Noted: 2017-07-12

## 2021-07-09 PROBLEM — R55 SYNCOPE AND COLLAPSE: Status: ACTIVE | Noted: 2021-07-09

## 2021-07-09 PROBLEM — I07.1 TRICUSPID VALVE REGURGITATION: Status: ACTIVE | Noted: 2021-07-09

## 2021-07-09 PROBLEM — I95.1 SYNCOPE DUE TO ORTHOSTATIC HYPOTENSION: Status: ACTIVE | Noted: 2017-07-12

## 2021-07-09 LAB
ALBUMIN SERPL-MCNC: 4.5 G/DL (ref 3.5–5.2)
ALBUMIN/GLOB SERPL: 1.5 G/DL
ALP SERPL-CCNC: 124 U/L (ref 39–117)
ALT SERPL W P-5'-P-CCNC: 31 U/L (ref 1–33)
ANION GAP SERPL CALCULATED.3IONS-SCNC: 11 MMOL/L (ref 5–15)
AST SERPL-CCNC: 22 U/L (ref 1–32)
BILIRUB SERPL-MCNC: 0.2 MG/DL (ref 0–1.2)
BUN SERPL-MCNC: 7 MG/DL (ref 6–20)
BUN/CREAT SERPL: 9.1 (ref 7–25)
CALCIUM SPEC-SCNC: 9 MG/DL (ref 8.6–10.5)
CHLORIDE SERPL-SCNC: 103 MMOL/L (ref 98–107)
CO2 SERPL-SCNC: 23 MMOL/L (ref 22–29)
CREAT SERPL-MCNC: 0.77 MG/DL (ref 0.57–1)
GFR SERPL CREATININE-BSD FRML MDRD: 81 ML/MIN/1.73
GLOBULIN UR ELPH-MCNC: 3 GM/DL
GLUCOSE SERPL-MCNC: 88 MG/DL (ref 65–99)
HOLD SPECIMEN: NORMAL
POTASSIUM SERPL-SCNC: 4 MMOL/L (ref 3.5–5.2)
PROT SERPL-MCNC: 7.5 G/DL (ref 6–8.5)
SODIUM SERPL-SCNC: 137 MMOL/L (ref 136–145)
T4 FREE SERPL-MCNC: 1.29 NG/DL (ref 0.93–1.7)
TSH SERPL DL<=0.05 MIU/L-ACNC: 2.05 UIU/ML (ref 0.27–4.2)

## 2021-07-09 PROCEDURE — 80053 COMPREHEN METABOLIC PANEL: CPT

## 2021-07-09 PROCEDURE — 99214 OFFICE O/P EST MOD 30 MIN: CPT | Performed by: NURSE PRACTITIONER

## 2021-07-09 PROCEDURE — 84439 ASSAY OF FREE THYROXINE: CPT

## 2021-07-09 PROCEDURE — 84443 ASSAY THYROID STIM HORMONE: CPT

## 2021-07-09 PROCEDURE — 36415 COLL VENOUS BLD VENIPUNCTURE: CPT

## 2021-07-09 RX ORDER — LEVOTHYROXINE SODIUM 0.05 MG/1
50 TABLET ORAL DAILY
COMMUNITY
Start: 2021-07-02 | End: 2021-07-09 | Stop reason: SDUPTHER

## 2021-07-09 RX ORDER — LEVOTHYROXINE SODIUM 0.05 MG/1
50 TABLET ORAL DAILY
Qty: 90 TABLET | Refills: 1 | Status: SHIPPED | OUTPATIENT
Start: 2021-07-09 | End: 2022-01-13

## 2021-07-09 RX ORDER — METOPROLOL SUCCINATE 50 MG/1
50 TABLET, EXTENDED RELEASE ORAL DAILY
Qty: 90 TABLET | Refills: 1 | Status: SHIPPED | OUTPATIENT
Start: 2021-07-09 | End: 2022-05-19 | Stop reason: SDUPTHER

## 2021-07-09 NOTE — PROGRESS NOTES
"Chief Complaint  Establish Care, Hypothyroidism (tumor, labs, med refills), and Lupus    Subjective          Rossi Osullivan presents to [facility]    Gay is here today to follow up on hypothyroidism. She is currently taking levothyroxine 50 mcg po once daily. She has history of thyroid tumor around 2001 s/p surgical intervention. She has not had US for 'a long time'. She notes recent feeling of swelling in her neck.   Additionally, she has history of lupus. Was previously seeing rheumatology Dr Ballesteros but has not seen for some time. Was previously on plaquenil. Not sure who she wants to see to reestablish with rheumatology.    Also previously on metoprolol succinate 100 mg once daily. Has not taken for some time. This was for history of SVT and palpitations.  She saw cardiologist Dr. Castillo last year but has not had appointment since 3/4/2020.  History of anxiety. She has a longstanding history of anxiety and PTSD.  Was previously asked to establish with a psychiatrist as her previous psychiatrist had moved his practice.  She has not yet done so.  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  She has tried multiple antidepressants in the past and experienced side effects to these medications.  Currently denies suicidal ideation or homicidal ideation            Objective   Vital Signs:   /76   Pulse 97   Temp 98.2 °F (36.8 °C)   Ht 172.7 cm (68\")   Wt 82 kg (180 lb 12.8 oz)   SpO2 97%   BMI 27.49 kg/m²     Physical Exam  Vitals reviewed.   Constitutional:       General: She is not in acute distress.     Appearance: Normal appearance. She is well-developed.   HENT:      Head: Normocephalic and atraumatic.   Cardiovascular:      Rate and Rhythm: Normal rate and regular rhythm.   Pulmonary:      Effort: Pulmonary effort is normal.      Breath sounds: Normal breath sounds.   Neurological:      Mental Status: She " is alert and oriented to person, place, and time.   Psychiatric:         Mood and Affect: Mood and affect normal.          Result Review :              Assessment and Plan    Diagnoses and all orders for this visit:    1. Hypothyroidism, unspecified type (Primary)  Comments:  Checking labs and refilling current medication.  Will make adjustments as needed.  Orders:  -     levothyroxine (SYNTHROID, LEVOTHROID) 50 MCG tablet; Take 1 tablet by mouth Daily.  Dispense: 90 tablet; Refill: 1  -     TSH; Future  -     T4, Free; Future  -     Comprehensive Metabolic Panel; Future    2. Thyroid nodule  Comments:  Will order repeat ultrasound with history and complaints of neck swelling.  Orders:  -     US Thyroid    3. Systemic lupus erythematosus, unspecified SLE type, unspecified organ involvement status (CMS/HCC)  Comments:  Will refer to rheumatology.  She is unsure as to provider preference at this time.  Will call back with provider name so that referral can be placed.    4. Generalized anxiety disorder  -     Ambulatory Referral to Psychiatry    5. Supraventricular tachycardia (CMS/HCC)  Comments:  Will restart metoprolol at lower dose as she has been off medications.  She will need to get back in to see cardiology.  Reports that she will call to make appt  Orders:  -     metoprolol succinate XL (Toprol XL) 50 MG 24 hr tablet; Take 1 tablet by mouth Daily for 180 days.  Dispense: 90 tablet; Refill: 1    6. Encounter for screening mammogram for malignant neoplasm of breast  -     Mammo Screening Bilateral With CAD; Future        Follow Up    Return in about 6 months (around 1/9/2022) for Annual physical.  Patient was given instructions and counseling regarding her condition or for health maintenance advice. Please see specific information pulled into the AVS if appropriate.
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
## 2021-07-22 ENCOUNTER — OFFICE VISIT (OUTPATIENT)
Dept: PSYCHIATRY | Facility: CLINIC | Age: 46
End: 2021-07-22

## 2021-07-22 VITALS
HEIGHT: 68 IN | WEIGHT: 182.6 LBS | BODY MASS INDEX: 27.68 KG/M2 | DIASTOLIC BLOOD PRESSURE: 71 MMHG | SYSTOLIC BLOOD PRESSURE: 131 MMHG

## 2021-07-22 DIAGNOSIS — F43.10 POST TRAUMATIC STRESS DISORDER (PTSD): ICD-10-CM

## 2021-07-22 DIAGNOSIS — G47.00 INSOMNIA, UNSPECIFIED TYPE: ICD-10-CM

## 2021-07-22 DIAGNOSIS — F41.0 PANIC DISORDER: ICD-10-CM

## 2021-07-22 DIAGNOSIS — F41.1 GENERALIZED ANXIETY DISORDER: Primary | ICD-10-CM

## 2021-07-22 DIAGNOSIS — F33.0 MILD EPISODE OF RECURRENT MAJOR DEPRESSIVE DISORDER (HCC): ICD-10-CM

## 2021-07-22 PROCEDURE — 90792 PSYCH DIAG EVAL W/MED SRVCS: CPT | Performed by: PHYSICIAN ASSISTANT

## 2021-07-22 RX ORDER — VILAZODONE HYDROCHLORIDE 20 MG/1
20 TABLET ORAL DAILY
Qty: 30 TABLET | Refills: 1 | Status: SHIPPED | OUTPATIENT
Start: 2021-07-22 | End: 2022-05-19

## 2021-07-22 RX ORDER — QUETIAPINE FUMARATE 50 MG/1
TABLET, FILM COATED ORAL
Qty: 30 TABLET | Refills: 1 | Status: SHIPPED | OUTPATIENT
Start: 2021-07-22 | End: 2022-05-19

## 2021-07-22 RX ORDER — PSEUDOEPHEDRINE HCL 120 MG/1
120 TABLET, FILM COATED, EXTENDED RELEASE ORAL EVERY 12 HOURS
COMMUNITY
End: 2022-05-19

## 2021-08-04 ENCOUNTER — HOSPITAL ENCOUNTER (OUTPATIENT)
Dept: ULTRASOUND IMAGING | Facility: HOSPITAL | Age: 46
Discharge: HOME | End: 2021-08-04
Admitting: NURSE PRACTITIONER

## 2021-08-04 ENCOUNTER — HOSPITAL ENCOUNTER (OUTPATIENT)
Dept: MAMMOGRAPHY | Facility: HOSPITAL | Age: 46
Discharge: HOME | End: 2021-08-04
Admitting: NURSE PRACTITIONER

## 2021-08-04 DIAGNOSIS — Z12.31 ENCOUNTER FOR SCREENING MAMMOGRAM FOR MALIGNANT NEOPLASM OF BREAST: ICD-10-CM

## 2021-08-04 PROCEDURE — 77063 BREAST TOMOSYNTHESIS BI: CPT

## 2021-08-04 PROCEDURE — 77063 BREAST TOMOSYNTHESIS BI: CPT | Performed by: RADIOLOGY

## 2021-08-04 PROCEDURE — 77067 SCR MAMMO BI INCL CAD: CPT | Performed by: RADIOLOGY

## 2021-08-04 PROCEDURE — 76536 US EXAM OF HEAD AND NECK: CPT

## 2021-08-04 PROCEDURE — 77067 SCR MAMMO BI INCL CAD: CPT

## 2022-01-13 DIAGNOSIS — E03.9 HYPOTHYROIDISM, UNSPECIFIED TYPE: ICD-10-CM

## 2022-01-13 RX ORDER — LEVOTHYROXINE SODIUM 0.05 MG/1
TABLET ORAL
Qty: 30 TABLET | Refills: 0 | Status: SHIPPED | OUTPATIENT
Start: 2022-01-13 | End: 2022-02-09

## 2022-02-09 DIAGNOSIS — E03.9 HYPOTHYROIDISM, UNSPECIFIED TYPE: ICD-10-CM

## 2022-02-09 RX ORDER — LEVOTHYROXINE SODIUM 0.05 MG/1
TABLET ORAL
Qty: 30 TABLET | Refills: 0 | Status: SHIPPED | OUTPATIENT
Start: 2022-02-09 | End: 2022-05-19 | Stop reason: SDUPTHER

## 2022-05-19 ENCOUNTER — LAB (OUTPATIENT)
Dept: LAB | Facility: HOSPITAL | Age: 47
End: 2022-05-19

## 2022-05-19 ENCOUNTER — OFFICE VISIT (OUTPATIENT)
Dept: FAMILY MEDICINE CLINIC | Age: 47
End: 2022-05-19

## 2022-05-19 VITALS
TEMPERATURE: 98.9 F | SYSTOLIC BLOOD PRESSURE: 101 MMHG | HEART RATE: 74 BPM | BODY MASS INDEX: 25.01 KG/M2 | WEIGHT: 165 LBS | DIASTOLIC BLOOD PRESSURE: 48 MMHG | HEIGHT: 68 IN

## 2022-05-19 DIAGNOSIS — E04.1 THYROID NODULE: ICD-10-CM

## 2022-05-19 DIAGNOSIS — Z12.31 ENCOUNTER FOR SCREENING MAMMOGRAM FOR MALIGNANT NEOPLASM OF BREAST: ICD-10-CM

## 2022-05-19 DIAGNOSIS — I47.1 SUPRAVENTRICULAR TACHYCARDIA: ICD-10-CM

## 2022-05-19 DIAGNOSIS — M32.9 SYSTEMIC LUPUS ERYTHEMATOSUS, UNSPECIFIED SLE TYPE, UNSPECIFIED ORGAN INVOLVEMENT STATUS: ICD-10-CM

## 2022-05-19 DIAGNOSIS — E03.9 HYPOTHYROIDISM, UNSPECIFIED TYPE: Primary | ICD-10-CM

## 2022-05-19 DIAGNOSIS — E03.9 HYPOTHYROIDISM, UNSPECIFIED TYPE: ICD-10-CM

## 2022-05-19 LAB
ALBUMIN SERPL-MCNC: 4.3 G/DL (ref 3.5–5.2)
ALBUMIN/GLOB SERPL: 1.8 G/DL
ALP SERPL-CCNC: 105 U/L (ref 39–117)
ALT SERPL W P-5'-P-CCNC: 12 U/L (ref 1–33)
ANION GAP SERPL CALCULATED.3IONS-SCNC: 10 MMOL/L (ref 5–15)
AST SERPL-CCNC: 14 U/L (ref 1–32)
BASOPHILS # BLD AUTO: 0.04 10*3/MM3 (ref 0–0.2)
BASOPHILS NFR BLD AUTO: 0.7 % (ref 0–1.5)
BILIRUB SERPL-MCNC: 0.2 MG/DL (ref 0–1.2)
BUN SERPL-MCNC: 10 MG/DL (ref 6–20)
BUN/CREAT SERPL: 13.7 (ref 7–25)
CALCIUM SPEC-SCNC: 8.8 MG/DL (ref 8.6–10.5)
CHLORIDE SERPL-SCNC: 103 MMOL/L (ref 98–107)
CO2 SERPL-SCNC: 23 MMOL/L (ref 22–29)
CREAT SERPL-MCNC: 0.73 MG/DL (ref 0.57–1)
DEPRECATED RDW RBC AUTO: 44.9 FL (ref 37–54)
EGFRCR SERPLBLD CKD-EPI 2021: 103.5 ML/MIN/1.73
EOSINOPHIL # BLD AUTO: 0.02 10*3/MM3 (ref 0–0.4)
EOSINOPHIL NFR BLD AUTO: 0.3 % (ref 0.3–6.2)
ERYTHROCYTE [DISTWIDTH] IN BLOOD BY AUTOMATED COUNT: 13 % (ref 12.3–15.4)
GLOBULIN UR ELPH-MCNC: 2.4 GM/DL
GLUCOSE SERPL-MCNC: 87 MG/DL (ref 65–99)
HCT VFR BLD AUTO: 41.4 % (ref 34–46.6)
HGB BLD-MCNC: 13.9 G/DL (ref 12–15.9)
IMM GRANULOCYTES # BLD AUTO: 0.01 10*3/MM3 (ref 0–0.05)
IMM GRANULOCYTES NFR BLD AUTO: 0.2 % (ref 0–0.5)
LYMPHOCYTES # BLD AUTO: 2.37 10*3/MM3 (ref 0.7–3.1)
LYMPHOCYTES NFR BLD AUTO: 38.8 % (ref 19.6–45.3)
MAGNESIUM SERPL-MCNC: 2.2 MG/DL (ref 1.6–2.6)
MCH RBC QN AUTO: 31.4 PG (ref 26.6–33)
MCHC RBC AUTO-ENTMCNC: 33.6 G/DL (ref 31.5–35.7)
MCV RBC AUTO: 93.7 FL (ref 79–97)
MONOCYTES # BLD AUTO: 0.42 10*3/MM3 (ref 0.1–0.9)
MONOCYTES NFR BLD AUTO: 6.9 % (ref 5–12)
NEUTROPHILS NFR BLD AUTO: 3.25 10*3/MM3 (ref 1.7–7)
NEUTROPHILS NFR BLD AUTO: 53.1 % (ref 42.7–76)
PLATELET # BLD AUTO: 338 10*3/MM3 (ref 140–450)
PMV BLD AUTO: 9.7 FL (ref 6–12)
POTASSIUM SERPL-SCNC: 3.9 MMOL/L (ref 3.5–5.2)
PROT SERPL-MCNC: 6.7 G/DL (ref 6–8.5)
RBC # BLD AUTO: 4.42 10*6/MM3 (ref 3.77–5.28)
SODIUM SERPL-SCNC: 136 MMOL/L (ref 136–145)
TSH SERPL DL<=0.05 MIU/L-ACNC: 1.27 UIU/ML (ref 0.27–4.2)
WBC NRBC COR # BLD: 6.11 10*3/MM3 (ref 3.4–10.8)

## 2022-05-19 PROCEDURE — 36415 COLL VENOUS BLD VENIPUNCTURE: CPT

## 2022-05-19 PROCEDURE — 84443 ASSAY THYROID STIM HORMONE: CPT

## 2022-05-19 PROCEDURE — 85025 COMPLETE CBC W/AUTO DIFF WBC: CPT

## 2022-05-19 PROCEDURE — 99214 OFFICE O/P EST MOD 30 MIN: CPT | Performed by: NURSE PRACTITIONER

## 2022-05-19 PROCEDURE — 93000 ELECTROCARDIOGRAM COMPLETE: CPT | Performed by: NURSE PRACTITIONER

## 2022-05-19 PROCEDURE — 83735 ASSAY OF MAGNESIUM: CPT

## 2022-05-19 PROCEDURE — 80053 COMPREHEN METABOLIC PANEL: CPT

## 2022-05-19 RX ORDER — CLINDAMYCIN HYDROCHLORIDE 150 MG/1
CAPSULE ORAL
COMMUNITY
Start: 2022-04-04 | End: 2022-05-19

## 2022-05-19 RX ORDER — LEVOTHYROXINE SODIUM 0.05 MG/1
50 TABLET ORAL EVERY MORNING
Qty: 90 TABLET | Refills: 1 | Status: SHIPPED | OUTPATIENT
Start: 2022-05-19 | End: 2023-01-09

## 2022-05-19 RX ORDER — HYDROCODONE BITARTRATE AND ACETAMINOPHEN 5; 325 MG/1; MG/1
TABLET ORAL
COMMUNITY
Start: 2022-04-04 | End: 2022-05-19

## 2022-05-19 RX ORDER — DICLOFENAC SODIUM 75 MG/1
75 TABLET, DELAYED RELEASE ORAL AS NEEDED
COMMUNITY
Start: 2022-02-22 | End: 2022-06-13

## 2022-05-19 RX ORDER — METOPROLOL SUCCINATE 50 MG/1
50 TABLET, EXTENDED RELEASE ORAL DAILY
Qty: 90 TABLET | Refills: 1 | Status: SHIPPED | OUTPATIENT
Start: 2022-05-19 | End: 2022-06-13 | Stop reason: DRUGHIGH

## 2022-05-19 NOTE — PROGRESS NOTES
Chief Complaint  Rossi Osullivan presents to [facility] for Hypothyroidism (Follow up /Chest pain, flutter, 05.17.22)    Subjective          History of Present Illness    Gay is here today to follow up on hypothyroidism. Currently taking levothyroxine. Hx thyroid tumor around 2001 s/p surgical intervention. US 8/2021 showed bilateral thyroid nodules - largest 1.8 cm TR 3 nodule in the lower right lobe. 12 month follow up recommended.   Additionally, she reports prior diagnosis of lupus. Was previously seeing rheumatology Dr Ballesteros but has not seen for some time. Was previously on plaquenil. She would like rheumatology referral.  Was also previously started on metoprolol succinate for SVT and palpitations. Was restarted at last appointment.  She previously saw cardiologist Dr. Castillo but has not seen since 2020. She has not been taking her metoprolol regularly. She has had some palpitations recently. Improved after taking metoprolol. She has called and scheduled appointment with cardiology.   She  has a longstanding history of anxiety and PTSD.   She has tried multiple antidepressants in the past and experienced side effects to these medications. Was referred to psychiatry at last appointment. She did not click with who she saw. She declines any other referrals at this time. Denies suicidal ideation at this time.   Declines screening colonoscopy at this time.     Review of Systems      Allergies   Allergen Reactions   • Cinnamon Anaphylaxis   • Latex Anaphylaxis   • Penicillins Anaphylaxis   • Vancocin Hcl [Vancomycin] Swelling   • Zinc Oxide Angioedema   • Reglan [Metoclopramide] Other (See Comments)     lactation      Past Medical History:   Diagnosis Date   • Depression    • Generalized anxiety disorder    • Hypothyroidism    • Mitral valve prolapse    • PTSD (post-traumatic stress disorder)    • Supraventricular tachycardia (HCC)    • Systemic lupus erythematosus  "(HCC)    • Tricuspid valve regurgitation      Current Outpatient Medications   Medication Sig Dispense Refill   • diclofenac (VOLTAREN) 75 MG EC tablet Take 75 mg by mouth As Needed.     • levothyroxine (SYNTHROID, LEVOTHROID) 50 MCG tablet Take 1 tablet by mouth Every Morning. 90 tablet 1   • metoprolol succinate XL (Toprol XL) 50 MG 24 hr tablet Take 1 tablet by mouth Daily for 180 days. 90 tablet 1     No current facility-administered medications for this visit.     Past Surgical History:   Procedure Laterality Date   •  SECTION     • CHOLECYSTECTOMY     • DILATION AND CURETTAGE, DIAGNOSTIC / THERAPEUTIC     • ENDOSCOPY      x4   • HYSTERECTOMY  2007    still has right ovary    • KNEE ARTHROSCOPY Left    • THYROIDECTOMY      partial      Social History     Tobacco Use   • Smoking status: Current Every Day Smoker     Packs/day: 1.00     Years: 17.00     Pack years: 17.00     Types: Cigarettes   • Smokeless tobacco: Never Used   Vaping Use   • Vaping Use: Never used   Substance Use Topics   • Alcohol use: Defer   • Drug use: Never     Family History   Problem Relation Age of Onset   • Aortic aneurysm Mother    • Cancer Mother         disocered at stage !V   • Anxiety disorder Mother    • Depression Mother      Health Maintenance Due   Topic Date Due   • COLORECTAL CANCER SCREENING  Never done   • ANNUAL PHYSICAL  Never done      Immunization History   Administered Date(s) Administered   • COVID-19 (MODERNA) 1st, 2nd, 3rd Dose Only 2021, 2021, 2021   • Flu Vaccine Intradermal Quad 18-64YR 10/01/2016   • Hep B, Unspecified 2012, 2012, 2012   • TD Preservative Free 2008   • Tdap 2012        Objective     Vitals:    22 1250 22 1258   BP: 101/50 101/48   BP Location: Left arm Right arm   Patient Position: Sitting Sitting   Pulse: 79 74   Temp: 98.9 °F (37.2 °C)    TempSrc: Oral    Weight: 74.8 kg (165 lb)    Height: 172.7 cm (68\")      Body mass " index is 25.09 kg/m².     Physical Exam  Vitals reviewed.   Constitutional:       General: She is not in acute distress.     Appearance: Normal appearance. She is well-developed.   HENT:      Head: Normocephalic and atraumatic.   Cardiovascular:      Rate and Rhythm: Normal rate and regular rhythm.   Pulmonary:      Effort: Pulmonary effort is normal.      Breath sounds: Normal breath sounds.   Neurological:      Mental Status: She is alert and oriented to person, place, and time.   Psychiatric:         Mood and Affect: Mood and affect normal.           Result Review :     The following data was reviewed by: KELSI Fernandez on 05/19/2022:          Comprehensive Metabolic Panel (07/09/2021 13:23)  T4, Free (07/09/2021 13:23)  Extra Tubes (07/09/2021 13:23)  TSH (07/09/2021 13:23)       US Thyroid (08/04/2021 15:34)  Mammo Screening Digital Tomosynthesis Bilateral With CAD (08/04/2021 15:08)             Assessment and Plan      Diagnoses and all orders for this visit:    1. Hypothyroidism, unspecified type (Primary)  Assessment & Plan:  Rechecking labs. Continue current med for now.     Orders:  -     levothyroxine (SYNTHROID, LEVOTHROID) 50 MCG tablet; Take 1 tablet by mouth Every Morning.  Dispense: 90 tablet; Refill: 1  -     TSH Rfx On Abnormal To Free T4; Future    2. Thyroid nodule  Assessment & Plan:  Repeat US in August. May need ENT referral.     Orders:  -     US Thyroid; Future  -     TSH Rfx On Abnormal To Free T4; Future    3. Supraventricular tachycardia (HCC)  Assessment & Plan:  Resume metoprolol. Keep scheduled follow up with cardiology    Orders:  -     metoprolol succinate XL (Toprol XL) 50 MG 24 hr tablet; Take 1 tablet by mouth Daily for 180 days.  Dispense: 90 tablet; Refill: 1  -     CBC w AUTO Differential; Future  -     Comprehensive metabolic panel; Future  -     TSH Rfx On Abnormal To Free T4; Future  -     Magnesium; Future  -     ECG 12 Lead    4. Encounter for screening mammogram for  malignant neoplasm of breast  -     Mammo Screening Digital Tomosynthesis Bilateral With CAD; Future    5. Systemic lupus erythematosus, unspecified SLE type, unspecified organ involvement status (HCC)  -     Ambulatory Referral to Rheumatology            Follow Up     Return in about 3 months (around 8/19/2022) for Annual physical.

## 2022-05-23 NOTE — PROGRESS NOTES
Procedure     ECG 12 Lead    Date/Time: 5/23/2022 5:56 PM  Performed by: Bryan Issa MD  Authorized by: Bryan Issa MD   Comparison: not compared with previous ECG   Rhythm: sinus rhythm  Rate: normal  Conduction: conduction normal  ST Segments: ST segments normal  T Waves: T waves normal  QRS axis: normal    Clinical impression: normal ECG

## 2022-06-09 PROBLEM — I34.1 MITRAL VALVE PROLAPSE: Chronic | Status: ACTIVE | Noted: 2021-07-09

## 2022-06-09 NOTE — PROGRESS NOTES
Chief Complaint  Mitral Valve Prolapse and Blood Pressure    Subjective        Rossi Osullivan presents to [facility]  She is a 46-year-old female comes in to evaluate her mitral valve regurgitation.  Complains of palpitations at least 3 days a work describes as a fluttery or fast sensation.  When it gets to beating really hard she will have some chest discomfort.  Admits she does not take her metoprolol ER 50 mg half a tab twice daily  regularly because of low blood pressures.  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  This seems to control her pedal edema.  She has a thyroid tumor and at this time is being monitored by PCP.  Shortness of breath is unchanged. Denies  dizziness, syncope, PND, and orthopnea.  She continues to smoke but she is trying hard to stop.     Past History:    Past Medical History:   Diagnosis Date   • Depression    • Generalized anxiety disorder    • Hypothyroidism    • Mitral valve prolapse    • PTSD (post-traumatic stress disorder)    • Supraventricular tachycardia (HCC)    • Systemic lupus erythematosus (HCC)    • Tricuspid valve regurgitation         Family History: family history includes Anxiety disorder in her mother; Aortic aneurysm in her mother; Cancer in her mother; Depression in her mother.     Social History: reports that she has been smoking cigarettes. She has a 17.00 pack-year smoking history. She has never used smokeless tobacco. She reports previous alcohol use. She reports that she does not use drugs.    Allergies: Cinnamon, Latex, Penicillins, Vancocin hcl [vancomycin], Zinc oxide, and Reglan [metoclopramide]    Past Surgical History:   Procedure Laterality Date   •  SECTION     • CHOLECYSTECTOMY     • DILATION AND CURETTAGE, DIAGNOSTIC / THERAPEUTIC     • ENDOSCOPY      x4   • HYSTERECTOMY  2007    still has right ovary    • KNEE ARTHROSCOPY Left    • THYROIDECTOMY      partial          Current Outpatient  "Medications:   •  levothyroxine (SYNTHROID, LEVOTHROID) 50 MCG tablet, Take 1 tablet by mouth Every Morning., Disp: 90 tablet, Rfl: 1  •  Pseudoephedrine HCl (SUDOPHED PO), Take  by mouth Daily., Disp: , Rfl:   •  metoprolol succinate XL (TOPROL-XL) 25 MG 24 hr tablet, Take 1 tablet by mouth Daily., Disp: 90 tablet, Rfl: 3  •  nicotine (Nicoderm CQ) 14 MG/24HR patch, Place 1 patch on the skin as directed by provider Daily., Disp: 14 patch, Rfl: 0  •  nicotine (Nicoderm CQ) 7 MG/24HR patch, Place 1 patch on the skin as directed by provider Daily., Disp: 14 patch, Rfl: 0     Medications Discontinued During This Encounter   Medication Reason   • diclofenac (VOLTAREN) 75 MG EC tablet *Therapy completed   • metoprolol succinate XL (Toprol XL) 50 MG 24 hr tablet Dose adjustment        Review of Systems   Respiratory: Positive for shortness of breath. Negative for cough.    Cardiovascular: Positive for chest pain, palpitations and leg swelling.   All other systems reviewed and are negative.       Objective     Physical Exam  Constitutional:       General: She is not in acute distress.     Appearance: Normal appearance. She is normal weight.   Neck:      Vascular: No carotid bruit.   Cardiovascular:      Rate and Rhythm: Normal rate and regular rhythm.      Heart sounds: Murmur (Faint at the apex) heard.   Pulmonary:      Effort: Pulmonary effort is normal.      Breath sounds: Normal breath sounds.   Musculoskeletal:      Right lower leg: No edema.      Left lower leg: No edema.   Neurological:      Mental Status: She is alert.       /51   Pulse 88   Ht 172.7 cm (68\")   Wt 74.8 kg (165 lb)   BMI 25.09 kg/m²       Vitals:    06/13/22 0823   BP: 108/51   Pulse: 88       Result Review :         The following data was reviewed by: KELSI Lilly on 06/13/2022:        CMP    CMP 7/9/21 5/19/22   Glucose 88 87   BUN 7 10   Creatinine 0.77 0.73   eGFR Non African Am 81    Sodium 137 136   Potassium 4.0 3.9 "   Chloride 103 103   Calcium 9.0 8.8   Albumin 4.50 4.30   Total Bilirubin 0.2 0.2   Alkaline Phosphatase 124 (A) 105   AST (SGOT) 22 14   ALT (SGPT) 31 12   (A) Abnormal value            CBC w/diff    CBC w/Diff 5/19/22   WBC 6.11   RBC 4.42   Hemoglobin 13.9   Hematocrit 41.4   MCV 93.7   MCH 31.4   MCHC 33.6   RDW 13.0   Platelets 338   Neutrophil Rel % 53.1   Immature Granulocyte Rel % 0.2   Lymphocyte Rel % 38.8   Monocyte Rel % 6.9   Eosinophil Rel % 0.3   Basophil Rel % 0.7               Lab Results   Component Value Date    TSH 1.270 05/19/2022    TSH 2.050 07/09/2021    TSH 1.760 09/13/2019      Lab Results   Component Value Date    FREET4 1.29 07/09/2021    FREET4 1.3 09/13/2019     Magnesium   Date Value Ref Range Status   05/19/2022 2.2 1.6 - 2.6 mg/dL Final                    Assessment and Plan    Diagnoses and all orders for this visit:    1. Mitral valve prolapse (Primary)  Assessment & Plan:  Without shortness of breath.  We will continue to monitor      2. Supraventricular tachycardia (HCC)  Assessment & Plan:  Symptomatic at times.  Instructed try taking metoprolol ER 25 mg at night routinely.  She will do a blood pressure heart rate log and when she returns and tells me how she feels and we will adjust accordingly.  Also can take an extra 25 g as needed.    Orders:  -     metoprolol succinate XL (TOPROL-XL) 25 MG 24 hr tablet; Take 1 tablet by mouth Daily.  Dispense: 90 tablet; Refill: 3    3. Cigarette nicotine dependence with nicotine-induced disorder  Assessment & Plan:  Continues to smoke but is actively trying to stop now.  We will give her the nicotine patches to help.    Orders:  -     nicotine (Nicoderm CQ) 14 MG/24HR patch; Place 1 patch on the skin as directed by provider Daily.  Dispense: 14 patch; Refill: 0  -     nicotine (Nicoderm CQ) 7 MG/24HR patch; Place 1 patch on the skin as directed by provider Daily.  Dispense: 14 patch; Refill: 0          Follow Up     Return in about 6  months (around 12/13/2022) for with Dr. Castillo, EKG on next visit.    Patient was given instructions and counseling regarding her condition or for health maintenance advice. Please see specific information pulled into the AVS if appropriate.       KELSI Eller  06/13/22 08:20 EDT

## 2022-06-13 ENCOUNTER — OFFICE VISIT (OUTPATIENT)
Dept: CARDIOLOGY | Facility: CLINIC | Age: 47
End: 2022-06-13

## 2022-06-13 VITALS
HEART RATE: 88 BPM | SYSTOLIC BLOOD PRESSURE: 108 MMHG | BODY MASS INDEX: 25.01 KG/M2 | WEIGHT: 165 LBS | HEIGHT: 68 IN | DIASTOLIC BLOOD PRESSURE: 51 MMHG

## 2022-06-13 DIAGNOSIS — I34.1 MITRAL VALVE PROLAPSE: Primary | Chronic | ICD-10-CM

## 2022-06-13 DIAGNOSIS — I47.1 SUPRAVENTRICULAR TACHYCARDIA: Chronic | ICD-10-CM

## 2022-06-13 DIAGNOSIS — F17.219 CIGARETTE NICOTINE DEPENDENCE WITH NICOTINE-INDUCED DISORDER: ICD-10-CM

## 2022-06-13 PROCEDURE — 99204 OFFICE O/P NEW MOD 45 MIN: CPT | Performed by: NURSE PRACTITIONER

## 2022-06-13 RX ORDER — METOPROLOL SUCCINATE 25 MG/1
25 TABLET, EXTENDED RELEASE ORAL DAILY
Qty: 90 TABLET | Refills: 3 | Status: SHIPPED | OUTPATIENT
Start: 2022-06-13

## 2022-06-13 RX ORDER — NICOTINE 21 MG/24HR
1 PATCH, TRANSDERMAL 24 HOURS TRANSDERMAL EVERY 24 HOURS
Qty: 14 PATCH | Refills: 0 | Status: SHIPPED | OUTPATIENT
Start: 2022-06-13 | End: 2023-01-18

## 2022-06-13 NOTE — ASSESSMENT & PLAN NOTE
Symptomatic at times.  Instructed try taking metoprolol ER 25 mg at night routinely.  She will do a blood pressure heart rate log and when she returns and tells me how she feels and we will adjust accordingly.  Also can take an extra 25 g as needed.

## 2022-06-13 NOTE — ASSESSMENT & PLAN NOTE
Continues to smoke but is actively trying to stop now.  We will give her the nicotine patches to help.

## 2022-06-13 NOTE — PROGRESS NOTES
Chief Complaint  Mitral Valve Prolapse and Blood Pressure    Subjective     {Problem List  Visit Diagnosis   Encounters  Notes  Medications  Labs  Result Review Imaging  Media :23}   Rossi Osullivan presents to [facility]  History of Present Illness ***     Past History:    Past Medical History:   Diagnosis Date   • Depression    • Generalized anxiety disorder    • Hypothyroidism    • Mitral valve prolapse    • PTSD (post-traumatic stress disorder)    • Supraventricular tachycardia (HCC)    • Systemic lupus erythematosus (HCC)    • Tricuspid valve regurgitation         Family History: family history includes Anxiety disorder in her mother; Aortic aneurysm in her mother; Cancer in her mother; Depression in her mother.     Social History: reports that she has been smoking cigarettes. She has a 17.00 pack-year smoking history. She has never used smokeless tobacco. She reports previous alcohol use. She reports that she does not use drugs.    Allergies: Cinnamon, Latex, Penicillins, Vancocin hcl [vancomycin], Zinc oxide, and Reglan [metoclopramide]    Past Surgical History:   Procedure Laterality Date   •  SECTION     • CHOLECYSTECTOMY     • DILATION AND CURETTAGE, DIAGNOSTIC / THERAPEUTIC     • ENDOSCOPY      x4   • HYSTERECTOMY  2007    still has right ovary    • KNEE ARTHROSCOPY Left    • THYROIDECTOMY      partial          Current Outpatient Medications:   •  levothyroxine (SYNTHROID, LEVOTHROID) 50 MCG tablet, Take 1 tablet by mouth Every Morning., Disp: 90 tablet, Rfl: 1  •  Pseudoephedrine HCl (SUDOPHED PO), Take  by mouth Daily., Disp: , Rfl:   •  diclofenac (VOLTAREN) 75 MG EC tablet, Take 75 mg by mouth As Needed., Disp: , Rfl:   •  metoprolol succinate XL (Toprol XL) 50 MG 24 hr tablet, Take 1 tablet by mouth Daily for 180 days., Disp: 90 tablet, Rfl: 1     There are no discontinued medications.     Review of Systems   Respiratory: Positive for shortness of  "breath. Negative for cough.    Cardiovascular: Positive for chest pain, palpitations and leg swelling.        Objective     Physical Exam  /40   Pulse 86   Ht 172.7 cm (68\")   Wt 74.8 kg (165 lb)   BMI 25.09 kg/m²       Vitals:    06/13/22 0816   BP: 111/40   Pulse: 86       Result Review :{Labs  Result Review  Imaging  Med Tab  Media :23}     {The following data was reviewed by (Optional):92303}    The following data was reviewed by: Lourdes Todd on 06/13/2022:    {Ambulatory Labs (Optional):18834}    {Data reviewed (Optional):61461:::1}              Assessment and Plan {CC Problem List  Visit Diagnosis  ROS  Review (Popup)  Health Maintenance  Quality  BestPractice  Medications  SmartSets  SnapShot Encounters  Media :23}   Diagnoses and all orders for this visit:    1. Mitral valve prolapse (Primary)        {Time Spent (Optional):90696}    Follow Up {Instructions Charge Capture  Follow-up Communications :23}    No follow-ups on file.    Patient was given instructions and counseling regarding her condition or for health maintenance advice. Please see specific information pulled into the AVS if appropriate.       KELSI Eller  06/13/22 08:21 EDT   "

## 2022-08-05 ENCOUNTER — APPOINTMENT (OUTPATIENT)
Dept: ULTRASOUND IMAGING | Facility: HOSPITAL | Age: 47
End: 2022-08-05

## 2022-08-05 ENCOUNTER — APPOINTMENT (OUTPATIENT)
Dept: MAMMOGRAPHY | Facility: HOSPITAL | Age: 47
End: 2022-08-05

## 2022-08-10 ENCOUNTER — TELEPHONE (OUTPATIENT)
Dept: FAMILY MEDICINE CLINIC | Age: 47
End: 2022-08-10

## 2022-08-10 NOTE — TELEPHONE ENCOUNTER
----- Message from Cata Monte LPN sent at 8/10/2022  8:26 AM EDT -----      ----- Message -----  From: SYSTEM  Sent: 8/10/2022   1:23 AM EDT  To: [facility] [place] [facility] [place]

## 2022-08-31 ENCOUNTER — TELEPHONE (OUTPATIENT)
Dept: FAMILY MEDICINE CLINIC | Age: 47
End: 2022-08-31

## 2023-01-09 DIAGNOSIS — E03.9 HYPOTHYROIDISM, UNSPECIFIED TYPE: ICD-10-CM

## 2023-01-09 RX ORDER — LEVOTHYROXINE SODIUM 0.05 MG/1
50 TABLET ORAL EVERY MORNING
Qty: 30 TABLET | Refills: 0 | Status: SHIPPED | OUTPATIENT
Start: 2023-01-09 | End: 2023-01-18 | Stop reason: SDUPTHER

## 2023-01-18 ENCOUNTER — OFFICE VISIT (OUTPATIENT)
Dept: FAMILY MEDICINE CLINIC | Age: 48
End: 2023-01-18
Payer: COMMERCIAL

## 2023-01-18 VITALS
HEART RATE: 93 BPM | WEIGHT: 169 LBS | DIASTOLIC BLOOD PRESSURE: 78 MMHG | SYSTOLIC BLOOD PRESSURE: 119 MMHG | BODY MASS INDEX: 25.61 KG/M2 | HEIGHT: 68 IN

## 2023-01-18 DIAGNOSIS — E03.9 HYPOTHYROIDISM, UNSPECIFIED TYPE: ICD-10-CM

## 2023-01-18 DIAGNOSIS — F17.219 CIGARETTE NICOTINE DEPENDENCE WITH NICOTINE-INDUCED DISORDER: ICD-10-CM

## 2023-01-18 DIAGNOSIS — E04.1 THYROID NODULE: Primary | ICD-10-CM

## 2023-01-18 DIAGNOSIS — Z12.31 ENCOUNTER FOR SCREENING MAMMOGRAM FOR MALIGNANT NEOPLASM OF BREAST: ICD-10-CM

## 2023-01-18 DIAGNOSIS — M32.8 OTHER FORMS OF SYSTEMIC LUPUS ERYTHEMATOSUS, UNSPECIFIED ORGAN INVOLVEMENT STATUS: ICD-10-CM

## 2023-01-18 DIAGNOSIS — I47.1 SUPRAVENTRICULAR TACHYCARDIA: ICD-10-CM

## 2023-01-18 DIAGNOSIS — F41.1 GENERALIZED ANXIETY DISORDER: ICD-10-CM

## 2023-01-18 PROCEDURE — 99214 OFFICE O/P EST MOD 30 MIN: CPT | Performed by: NURSE PRACTITIONER

## 2023-01-18 RX ORDER — HYDROXYCHLOROQUINE SULFATE 200 MG/1
TABLET, FILM COATED ORAL
COMMUNITY
Start: 2022-08-15

## 2023-01-18 RX ORDER — CLONAZEPAM 1 MG/1
TABLET ORAL
COMMUNITY
Start: 2022-12-30

## 2023-01-18 RX ORDER — OMEGA-3 FATTY ACIDS/FISH OIL 300-1000MG
1 CAPSULE ORAL 4 TIMES DAILY
COMMUNITY

## 2023-01-18 RX ORDER — ATOMOXETINE 60 MG/1
1 CAPSULE ORAL
COMMUNITY

## 2023-01-18 RX ORDER — LEVOTHYROXINE SODIUM 0.05 MG/1
50 TABLET ORAL EVERY MORNING
Qty: 90 TABLET | Refills: 1 | Status: SHIPPED | OUTPATIENT
Start: 2023-01-18

## 2023-01-18 NOTE — PROGRESS NOTES
Chief Complaint  Rossi Osullivan presents to [facility] for Med Refill (Thyroid medication)    Subjective          History of Present Illness    Gay is here today to follow up on hypothyroidism. Currently taking levothyroxine. Last lab 5/19/22 normal range. Hx thyroid tumor around 2001 s/p surgical intervention. US 8/2021 showed bilateral thyroid nodules - largest 1.8 cm TR 3 nodule in the lower right lobe. 12 month follow up recommended. She has not had completed due to insurance issues. She feels that the nodule has increased in size as she has noticed some changes with her swallowing. She has not been taking her thyroid medication for a couple of weeks.   Additionally, she is on plaquenil for lupus. Her rheumatologist is Dr Thrasher.  She is on Toprol XL for SVT and palpitations. Seeing cardiology.  She  has a longstanding history of anxiety and PTSD.  She is seeing Shala DOLAN for her mental health provider. She is on straterra and clonazepam.   Declines screening colonoscopy at this time.     Review of Systems       Allergies   Allergen Reactions   • Cinnamon Anaphylaxis   • Latex Anaphylaxis   • Penicillins Anaphylaxis   • Vancocin Hcl [Vancomycin] Swelling   • Zinc Oxide Angioedema   • Reglan [Metoclopramide] Other (See Comments)     lactation      Past Medical History:   Diagnosis Date   • Depression    • Generalized anxiety disorder    • Hypothyroidism    • Mitral valve prolapse    • PTSD (post-traumatic stress disorder)    • Supraventricular tachycardia (HCC)    • Systemic lupus erythematosus (HCC)    • Tricuspid valve regurgitation      Current Outpatient Medications   Medication Sig Dispense Refill   • atomoxetine (STRATTERA) 60 MG capsule Take 1 capsule by mouth.     • clonazePAM (KlonoPIN) 1 MG tablet      • diphenhydrAMINE (SOMINEX) 25 MG tablet Take 2 tablets by mouth.     • hydroxychloroquine (PLAQUENIL) 200 MG tablet 1 1/2 tablet qday     • Ibuprofen  "200 MG capsule Take 1 capsule by mouth 4 (Four) Times a Day.     • levothyroxine (SYNTHROID, LEVOTHROID) 50 MCG tablet Take 1 tablet by mouth Every Morning. 90 tablet 1   • metoprolol succinate XL (TOPROL-XL) 25 MG 24 hr tablet Take 1 tablet by mouth Daily. 90 tablet 3     No current facility-administered medications for this visit.     Past Surgical History:   Procedure Laterality Date   •  SECTION     • CHOLECYSTECTOMY     • DILATION AND CURETTAGE, DIAGNOSTIC / THERAPEUTIC     • ENDOSCOPY      x4   • HYSTERECTOMY  2007    still has right ovary    • KNEE ARTHROSCOPY Left    • THYROIDECTOMY      partial      Social History     Tobacco Use   • Smoking status: Every Day     Packs/day: 1.00     Years: 17.00     Pack years: 17.00     Types: Cigarettes   • Smokeless tobacco: Never   Vaping Use   • Vaping Use: Never used   Substance Use Topics   • Alcohol use: Not Currently   • Drug use: Never     Family History   Problem Relation Age of Onset   • Aortic aneurysm Mother    • Cancer Mother         disocered at stage !V   • Anxiety disorder Mother    • Depression Mother      Health Maintenance Due   Topic Date Due   • COLORECTAL CANCER SCREENING  Never done   • ANNUAL PHYSICAL  Never done   • COVID-19 Vaccine (4 - Booster for Moderna series) 2022      Immunization History   Administered Date(s) Administered   • COVID-19 (MODERNA) 1st, 2nd, 3rd Dose Only 2021, 2021, 2021   • Flu Vaccine Intradermal Quad 18-64YR 10/01/2016   • Hep B, Unspecified 2012, 2012, 2012   • TD Preservative Free 2008   • Tdap 2012        Objective     Vitals:    23 1700   BP: 119/78   BP Location: Left arm   Patient Position: Sitting   Pulse: 93   Weight: 76.7 kg (169 lb)   Height: 172.7 cm (67.99\")     Body mass index is 25.7 kg/m².     Physical Exam  Vitals reviewed.   Constitutional:       General: She is not in acute distress.     Appearance: Normal appearance. She is " well-developed.   HENT:      Head: Normocephalic and atraumatic.   Cardiovascular:      Rate and Rhythm: Normal rate and regular rhythm.   Pulmonary:      Effort: Pulmonary effort is normal.      Breath sounds: Normal breath sounds.   Neurological:      Mental Status: She is alert and oriented to person, place, and time.   Psychiatric:         Mood and Affect: Mood and affect normal.           Result Review :                               Assessment and Plan      Diagnoses and all orders for this visit:    1. Thyroid nodule (Primary)  Comments:  Repeating thyroid US.   Orders:  -     US Thyroid; Future    2. Hypothyroidism, unspecified type  Comments:  Will restart levothyroxine at 50 mcg daily. Will return in a couple of months for lab recheck.   Orders:  -     levothyroxine (SYNTHROID, LEVOTHROID) 50 MCG tablet; Take 1 tablet by mouth Every Morning.  Dispense: 90 tablet; Refill: 1  -     TSH; Future    3. Encounter for screening mammogram for malignant neoplasm of breast  -     Mammo Screening Digital Tomosynthesis Bilateral With CAD; Future    4. Supraventricular tachycardia (HCC)  Comments:  Continue f/u with cardiology    5. Other forms of systemic lupus erythematosus, unspecified organ involvement status (HCC)  Comments:  Continue f/u with rheumatology    6. Generalized anxiety disorder  Comments:  Continue f/u with PMHNP    7. Cigarette nicotine dependence with nicotine-induced disorder  Assessment & Plan:  Tobacco use is unchanged.  Smoking cessation counseling was provided.  Tobacco use will be reassessed at the next regular appointment.              Follow Up     Return in about 6 months (around 7/18/2023) for Annual physical.

## 2023-03-16 ENCOUNTER — HOSPITAL ENCOUNTER (OUTPATIENT)
Dept: ULTRASOUND IMAGING | Facility: HOSPITAL | Age: 48
Discharge: HOME | End: 2023-03-16
Payer: COMMERCIAL

## 2023-03-16 ENCOUNTER — HOSPITAL ENCOUNTER (OUTPATIENT)
Dept: MAMMOGRAPHY | Facility: HOSPITAL | Age: 48
Discharge: HOME | End: 2023-03-16
Payer: COMMERCIAL

## 2023-03-16 DIAGNOSIS — Z12.31 ENCOUNTER FOR SCREENING MAMMOGRAM FOR MALIGNANT NEOPLASM OF BREAST: ICD-10-CM

## 2023-03-16 DIAGNOSIS — E04.1 THYROID NODULE: ICD-10-CM

## 2023-03-16 PROCEDURE — 77067 SCR MAMMO BI INCL CAD: CPT

## 2023-03-16 PROCEDURE — 77063 BREAST TOMOSYNTHESIS BI: CPT

## 2023-03-16 PROCEDURE — 76536 US EXAM OF HEAD AND NECK: CPT

## 2023-03-20 ENCOUNTER — TELEPHONE (OUTPATIENT)
Dept: FAMILY MEDICINE CLINIC | Age: 48
End: 2023-03-20
Payer: COMMERCIAL

## 2023-03-22 DIAGNOSIS — E04.1 THYROID NODULE: Primary | ICD-10-CM

## 2023-03-22 DIAGNOSIS — R13.10 DYSPHAGIA, UNSPECIFIED TYPE: ICD-10-CM

## 2023-07-20 ENCOUNTER — LAB (OUTPATIENT)
Dept: LAB | Facility: HOSPITAL | Age: 48
End: 2023-07-20
Payer: COMMERCIAL

## 2023-07-20 DIAGNOSIS — E03.9 HYPOTHYROIDISM, UNSPECIFIED TYPE: ICD-10-CM

## 2023-07-20 DIAGNOSIS — Z00.00 ANNUAL PHYSICAL EXAM: ICD-10-CM

## 2023-07-20 PROBLEM — E87.6 HYPOKALEMIA: Status: RESOLVED | Noted: 2017-07-12 | Resolved: 2023-07-20

## 2023-07-20 PROBLEM — I95.1 SYNCOPE DUE TO ORTHOSTATIC HYPOTENSION: Status: RESOLVED | Noted: 2017-07-12 | Resolved: 2023-07-20

## 2023-07-20 LAB
ALBUMIN SERPL-MCNC: 4.5 G/DL (ref 3.5–5.2)
ALBUMIN/GLOB SERPL: 1.7 G/DL
ALP SERPL-CCNC: 111 U/L (ref 39–117)
ALT SERPL W P-5'-P-CCNC: 21 U/L (ref 1–33)
ANION GAP SERPL CALCULATED.3IONS-SCNC: 8 MMOL/L (ref 5–15)
AST SERPL-CCNC: 21 U/L (ref 1–32)
BILIRUB SERPL-MCNC: 0.4 MG/DL (ref 0–1.2)
BUN SERPL-MCNC: 9 MG/DL (ref 6–20)
BUN/CREAT SERPL: 11 (ref 7–25)
CALCIUM SPEC-SCNC: 9.4 MG/DL (ref 8.6–10.5)
CHLORIDE SERPL-SCNC: 105 MMOL/L (ref 98–107)
CHOLEST SERPL-MCNC: 157 MG/DL (ref 0–200)
CO2 SERPL-SCNC: 26 MMOL/L (ref 22–29)
CREAT SERPL-MCNC: 0.82 MG/DL (ref 0.57–1)
DEPRECATED RDW RBC AUTO: 45.3 FL (ref 37–54)
EGFRCR SERPLBLD CKD-EPI 2021: 89.5 ML/MIN/1.73
ERYTHROCYTE [DISTWIDTH] IN BLOOD BY AUTOMATED COUNT: 13 % (ref 12.3–15.4)
GLOBULIN UR ELPH-MCNC: 2.6 GM/DL
GLUCOSE SERPL-MCNC: 86 MG/DL (ref 65–99)
HCT VFR BLD AUTO: 44.1 % (ref 34–46.6)
HDLC SERPL-MCNC: 38 MG/DL (ref 40–60)
HGB BLD-MCNC: 14.9 G/DL (ref 12–15.9)
LDLC SERPL CALC-MCNC: 102 MG/DL (ref 0–100)
LDLC/HDLC SERPL: 2.66 {RATIO}
MCH RBC QN AUTO: 31.6 PG (ref 26.6–33)
MCHC RBC AUTO-ENTMCNC: 33.8 G/DL (ref 31.5–35.7)
MCV RBC AUTO: 93.4 FL (ref 79–97)
PLATELET # BLD AUTO: 315 10*3/MM3 (ref 140–450)
PMV BLD AUTO: 9 FL (ref 6–12)
POTASSIUM SERPL-SCNC: 4.5 MMOL/L (ref 3.5–5.2)
PROT SERPL-MCNC: 7.1 G/DL (ref 6–8.5)
RBC # BLD AUTO: 4.72 10*6/MM3 (ref 3.77–5.28)
SODIUM SERPL-SCNC: 139 MMOL/L (ref 136–145)
TRIGL SERPL-MCNC: 90 MG/DL (ref 0–150)
TSH SERPL DL<=0.05 MIU/L-ACNC: 3.19 UIU/ML (ref 0.27–4.2)
VLDLC SERPL-MCNC: 17 MG/DL (ref 5–40)
WBC NRBC COR # BLD: 4.23 10*3/MM3 (ref 3.4–10.8)

## 2023-07-20 PROCEDURE — 80050 GENERAL HEALTH PANEL: CPT

## 2023-07-20 PROCEDURE — 80061 LIPID PANEL: CPT

## 2023-07-20 PROCEDURE — 36415 COLL VENOUS BLD VENIPUNCTURE: CPT

## 2024-02-02 DIAGNOSIS — E03.9 HYPOTHYROIDISM, UNSPECIFIED TYPE: ICD-10-CM

## 2024-02-02 RX ORDER — LEVOTHYROXINE SODIUM 0.05 MG/1
50 TABLET ORAL EVERY MORNING
Qty: 90 TABLET | Refills: 0 | Status: SHIPPED | OUTPATIENT
Start: 2024-02-02

## 2024-03-28 ENCOUNTER — TRANSCRIBE ORDERS (OUTPATIENT)
Dept: ADMINISTRATIVE | Facility: HOSPITAL | Age: 49
End: 2024-03-28
Payer: COMMERCIAL

## 2024-03-28 DIAGNOSIS — N64.52 DISCHARGE FROM BREAST: Primary | ICD-10-CM

## 2024-03-28 DIAGNOSIS — N64.4 PAIN IN BREAST: ICD-10-CM

## 2024-04-11 ENCOUNTER — HOSPITAL ENCOUNTER (OUTPATIENT)
Dept: MAMMOGRAPHY | Facility: HOSPITAL | Age: 49
Discharge: HOME | End: 2024-04-11
Payer: COMMERCIAL

## 2024-04-11 ENCOUNTER — HOSPITAL ENCOUNTER (OUTPATIENT)
Dept: ULTRASOUND IMAGING | Facility: HOSPITAL | Age: 49
Discharge: HOME | End: 2024-04-11
Payer: COMMERCIAL

## 2024-04-11 DIAGNOSIS — N64.52 DISCHARGE FROM BREAST: ICD-10-CM

## 2024-04-11 DIAGNOSIS — N64.4 PAIN IN BREAST: ICD-10-CM

## 2024-04-11 PROCEDURE — 76642 ULTRASOUND BREAST LIMITED: CPT

## 2024-04-11 PROCEDURE — 77066 DX MAMMO INCL CAD BI: CPT

## 2024-04-11 PROCEDURE — G0279 TOMOSYNTHESIS, MAMMO: HCPCS

## 2024-04-19 ENCOUNTER — TELEPHONE (OUTPATIENT)
Dept: SURGERY | Facility: CLINIC | Age: 49
End: 2024-04-19
Payer: COMMERCIAL

## 2024-04-19 DIAGNOSIS — N64.52 BLOODY DISCHARGE FROM RIGHT NIPPLE: Primary | ICD-10-CM

## 2024-04-29 ENCOUNTER — OFFICE VISIT (OUTPATIENT)
Dept: CARDIOLOGY | Facility: CLINIC | Age: 49
End: 2024-04-29
Payer: COMMERCIAL

## 2024-04-29 VITALS
DIASTOLIC BLOOD PRESSURE: 75 MMHG | SYSTOLIC BLOOD PRESSURE: 114 MMHG | HEIGHT: 68 IN | BODY MASS INDEX: 24.25 KG/M2 | HEART RATE: 101 BPM | WEIGHT: 160 LBS

## 2024-04-29 DIAGNOSIS — G90.9 AUTONOMIC DYSFUNCTION: ICD-10-CM

## 2024-04-29 DIAGNOSIS — F17.200 SMOKER: ICD-10-CM

## 2024-04-29 DIAGNOSIS — R42 DIZZINESS: Primary | ICD-10-CM

## 2024-04-29 PROCEDURE — 1159F MED LIST DOCD IN RCRD: CPT | Performed by: INTERNAL MEDICINE

## 2024-04-29 PROCEDURE — 1160F RVW MEDS BY RX/DR IN RCRD: CPT | Performed by: INTERNAL MEDICINE

## 2024-04-29 PROCEDURE — 99406 BEHAV CHNG SMOKING 3-10 MIN: CPT | Performed by: INTERNAL MEDICINE

## 2024-04-29 PROCEDURE — 99204 OFFICE O/P NEW MOD 45 MIN: CPT | Performed by: INTERNAL MEDICINE

## 2024-04-29 RX ORDER — LISDEXAMFETAMINE DIMESYLATE 50 MG/1
50 CAPSULE ORAL EVERY MORNING
COMMUNITY

## 2024-04-29 NOTE — PROGRESS NOTES
Chief Complaint  Mitral Valve Prolapse, <9>Supraventricular tachycardia, Loss of Consciousness, and Dizziness    Subjective            Rossi Osullivan presents to [facility] CARDIOLOGY  Mitral Valve Prolapse  Associated symptoms include abdominal pain, a change in bowel habit, headaches and myalgias. Pertinent negatives include no chest pain.   Dizziness  Associated symptoms include abdominal pain, a change in bowel habit, headaches and myalgias. Pertinent negatives include no chest pain.       48-year-old white female.  She has not seen a cardiologist in a couple of years.  She has a longstanding history of what sounds like autonomic dysfunction with previous abnormal tilt table testing.  She also reports a history of mitral valve prolapse, SVT and PVCs.  She has a multitude of medical problems as she describes including lupus, Sjogren's, mixed connective tissue disease, migraines, GI problems.  She sees specialist for several of these other issues.  From a cardiovascular standpoint she has frequent dizziness, presyncope, symptoms are daily, she has resting tachycardia with malaise.  She has had syncope many times in the past.  She has not been on a beta-blocker in the past couple of years.    PMH  Past Medical History:   Diagnosis Date    Depression     Generalized anxiety disorder     Hypothyroidism     Mitral valve prolapse     PTSD (post-traumatic stress disorder)     Supraventricular tachycardia     Systemic lupus erythematosus     Tricuspid valve regurgitation          SURGICALHX  Past Surgical History:   Procedure Laterality Date     SECTION      CHOLECYSTECTOMY      DILATION AND CURETTAGE, DIAGNOSTIC / THERAPEUTIC      ENDOSCOPY      x4    HYSTERECTOMY  2007    still has right ovary     KNEE ARTHROSCOPY Left     THYROIDECTOMY      partial        SOC  Social History     Socioeconomic History    Marital status:     Number of children: 2   Tobacco Use    Smoking  status: Every Day     Current packs/day: 1.00     Average packs/day: 1 pack/day for 17.0 years (17.0 ttl pk-yrs)     Types: Cigarettes     Passive exposure: Past    Smokeless tobacco: Never   Vaping Use    Vaping status: Never Used   Substance and Sexual Activity    Alcohol use: Yes     Comment: OCC    Drug use: Never    Sexual activity: Yes         FAMHX  Family History   Problem Relation Age of Onset    Aortic aneurysm Mother     Cancer Mother         disocered at stage !V    Anxiety disorder Mother     Depression Mother           ALLERGY  Allergies   Allergen Reactions    Cinnamon Anaphylaxis    Latex Anaphylaxis    Penicillins Anaphylaxis    Vancocin Hcl [Vancomycin] Swelling    Zinc Oxide Angioedema        MEDSCURRENT    Current Outpatient Medications:     clonazePAM (KlonoPIN) 1 MG tablet, Take 1 tablet by mouth 3 (Three) Times a Day., Disp: , Rfl:     Ibuprofen 200 MG capsule, Take 1 capsule by mouth As Needed., Disp: , Rfl:     levothyroxine (SYNTHROID, LEVOTHROID) 50 MCG tablet, TAKE 1 TABLET BY MOUTH EVERY MORNING., Disp: 90 tablet, Rfl: 0    lisdexamfetamine (Vyvanse) 50 MG capsule, Take 1 capsule by mouth Every Morning, Disp: , Rfl:     atomoxetine (Strattera) 80 MG capsule, Take 1 capsule by mouth Daily. (Patient not taking: Reported on 4/29/2024), Disp: , Rfl:     hydroxychloroquine (PLAQUENIL) 200 MG tablet, As Needed. (Patient not taking: Reported on 4/29/2024), Disp: , Rfl:     metoprolol succinate XL (TOPROL-XL) 25 MG 24 hr tablet, Take 1 tablet by mouth Daily. (Patient not taking: Reported on 4/29/2024), Disp: 90 tablet, Rfl: 3      Review of Systems   Constitutional: Negative. Positive for malaise/fatigue.   HENT: Negative.     Eyes: Negative.    Cardiovascular:  Positive for near-syncope and syncope. Negative for chest pain.   Respiratory: Negative.  Positive for shortness of breath.    Endocrine: Negative.    Hematologic/Lymphatic: Negative.    Skin: Negative.    Musculoskeletal: Negative.   "Positive for joint pain and myalgias.   Gastrointestinal: Negative.  Positive for bloating, abdominal pain and change in bowel habit.   Genitourinary: Negative.    Neurological: Negative.  Positive for dizziness, headaches and light-headedness.   Psychiatric/Behavioral: Negative.          Objective     /75   Pulse 101   Ht 172.7 cm (67.99\")   Wt 72.6 kg (160 lb)   BMI 24.34 kg/m²       General Appearance:   well developed  well nourished  HENT:   oropharynx moist  lips not cyanotic  Neck:  thyroid not enlarged  supple  Respiratory:  no respiratory distress  normal breath sounds  no rales  Cardiovascular:  no jugular venous distention  regular rhythm  apical impulse normal  S1 normal, S2 normal  no S3, no S4   no murmur  no rub, no thrill  carotid pulses normal; no bruit  pedal pulses normal  lower extremity edema: none    Musculoskeletal:  no clubbing of fingers.   normocephalic, head atraumatic  Skin:   warm, dry  Psychiatric:  judgement and insight appropriate  normal mood and affect      Result Review :     The following data was reviewed by: Quinn Jorge MD on 04/29/2024:    CMP          7/20/2023    10:22   CMP   Glucose 86    BUN 9    Creatinine 0.82    EGFR 89.5    Sodium 139    Potassium 4.5    Chloride 105    Calcium 9.4    Total Protein 7.1    Albumin 4.5    Globulin 2.6    Total Bilirubin 0.4    Alkaline Phosphatase 111    AST (SGOT) 21    ALT (SGPT) 21    Albumin/Globulin Ratio 1.7    BUN/Creatinine Ratio 11.0    Anion Gap 8.0      CBC          7/20/2023    10:22   CBC   WBC 4.23    RBC 4.72    Hemoglobin 14.9    Hematocrit 44.1    MCV 93.4    MCH 31.6    MCHC 33.8    RDW 13.0    Platelets 315      Lipid Panel          7/20/2023    10:22   Lipid Panel   Total Cholesterol 157    Triglycerides 90    HDL Cholesterol 38    VLDL Cholesterol 17    LDL Cholesterol  102    LDL/HDL Ratio 2.66      TSH          7/20/2023    10:22   TSH   TSH 3.190        Data reviewed : Primary care " records reviewed      Procedures      Rossi Osullivan  reports that she has been smoking cigarettes. She has a 17 pack-year smoking history. She has been exposed to tobacco smoke. She has never used smokeless tobacco. I have educated her on the risk of diseases from using tobacco products such as cancer, COPD, and heart disease.     I advised her to quit and she is willing to quit. We have discussed the following method/s for tobacco cessation:  Counseling.  Together we have set a quit date for  when she weans down to 0 cigarettes .  She will follow up with me in  the future   as needed  or sooner to check on her progress.    I spent 3  minutes counseling the patient.                Assessment and Plan        ASSESSMENT:  Encounter Diagnoses   Name Primary?    Dizziness Yes    Autonomic dysfunction     Smoker          PLAN:    1.  Dizziness-the patient clearly has evidence historically of autonomic dysfunction.  She has symptoms to some extent on a daily basis.  I recommended to her initially I recommend aggressive volume loading, stop drinking caffeinated beverages, and try to consume 64 to 84 ounces of water or 0 sugar sports drinks daily to try and prevent volume depletion as a component of her dizziness.  I do not think at this point the beta-blocker has a role.  Unfortunately with her tendency to be tachycardic I think she would feel worse on midodrine.  Compression stockings can be used to prevent venous pooling.  Also just awareness of her symptoms so that she may sit or lie down to prevent symptomatic hypotension or syncope.  I discussed with her today there is no specific cure for this issue and physiologically we just need to try to mitigate the severity of the symptoms.  2.  Schedule echo to evaluate mitral valve prolapse which is documented historically but it was not mentioned on her echocardiogram in 2022.  3.  Smoker-counseled    Follow-up to be determined          Patient was given instructions  and counseling regarding her condition or for health maintenance advice. Please see specific information pulled into the AVS if appropriate.             C Hugh Jorge MD  4/29/2024    15:13 EDT

## 2024-05-02 ENCOUNTER — TELEPHONE (OUTPATIENT)
Dept: CARDIOLOGY | Facility: CLINIC | Age: 49
End: 2024-05-02
Payer: COMMERCIAL

## 2024-05-02 NOTE — TELEPHONE ENCOUNTER
Right now there is only one opening for the year its in July. They have not opened the schedule past that from what I can tell. Im not sure the timeframe Dr Jorge wanted this done, would [place] be an option for the pt?

## 2024-05-02 NOTE — TELEPHONE ENCOUNTER
"Caller: Rossi Osullivan \"Gay\"    Relationship: Self    Best call back number: [phone]     What orders are you requesting (i.e. lab or imaging): ECHO    In what timeframe would the patient need to come in: PT CAN COME IN 5.6.24, 5.8.24 BEFORE 3PM, 5.10.24, 5.13.24, 5.15.24, 5.17.24, 5.20.24, 5.22.24    Where will you receive your lab/imaging services: [facility]    Additional notes: PT HAS ORDER TO [facility] BUT LIVES IN [place] AND WOULD LIKE TO GET IT DONE THERE IF POSSIBLE.        "

## 2024-05-07 ENCOUNTER — HOSPITAL ENCOUNTER (OUTPATIENT)
Dept: MRI IMAGING | Facility: HOSPITAL | Age: 49
Discharge: HOME | End: 2024-05-07
Admitting: SURGERY
Payer: COMMERCIAL

## 2024-05-07 DIAGNOSIS — N64.52 BLOODY DISCHARGE FROM RIGHT NIPPLE: ICD-10-CM

## 2024-05-07 PROCEDURE — 77049 MRI BREAST C-+ W/CAD BI: CPT

## 2024-05-07 PROCEDURE — 0 GADOBENATE DIMEGLUMINE 529 MG/ML SOLUTION: Performed by: SURGERY

## 2024-05-07 PROCEDURE — A9577 INJ MULTIHANCE: HCPCS | Performed by: SURGERY

## 2024-05-07 RX ADMIN — GADOBENATE DIMEGLUMINE 15 ML: 529 INJECTION, SOLUTION INTRAVENOUS at 13:32

## 2024-05-09 ENCOUNTER — OFFICE (OUTPATIENT)
Dept: URBAN - METROPOLITAN AREA CLINIC 66 | Facility: CLINIC | Age: 49
End: 2024-05-09
Payer: COMMERCIAL

## 2024-05-09 VITALS
DIASTOLIC BLOOD PRESSURE: 65 MMHG | SYSTOLIC BLOOD PRESSURE: 101 MMHG | HEIGHT: 68 IN | HEART RATE: 90 BPM | WEIGHT: 160 LBS

## 2024-05-09 DIAGNOSIS — K58.9 IRRITABLE BOWEL SYNDROME WITHOUT DIARRHEA: ICD-10-CM

## 2024-05-09 DIAGNOSIS — K21.9 GASTRO-ESOPHAGEAL REFLUX DISEASE WITHOUT ESOPHAGITIS: ICD-10-CM

## 2024-05-09 DIAGNOSIS — R13.10 DYSPHAGIA, UNSPECIFIED: ICD-10-CM

## 2024-05-09 DIAGNOSIS — Z12.11 ENCOUNTER FOR SCREENING FOR MALIGNANT NEOPLASM OF COLON: ICD-10-CM

## 2024-05-09 DIAGNOSIS — K62.5 HEMORRHAGE OF ANUS AND RECTUM: ICD-10-CM

## 2024-05-09 DIAGNOSIS — R19.4 CHANGE IN BOWEL HABIT: ICD-10-CM

## 2024-05-09 DIAGNOSIS — Z83.719 FAMILY HISTORY OF COLON POLYPS, UNSPECIFIED: ICD-10-CM

## 2024-05-09 PROCEDURE — 99204 OFFICE O/P NEW MOD 45 MIN: CPT | Performed by: NURSE PRACTITIONER

## 2024-05-09 RX ORDER — PANTOPRAZOLE SODIUM 40 MG/1
40 TABLET, DELAYED RELEASE ORAL
Qty: 90 | Refills: 3 | Status: ACTIVE
Start: 2024-05-09

## 2024-05-13 ENCOUNTER — HOSPITAL ENCOUNTER (OUTPATIENT)
Dept: SURGERY | Facility: HOSPITAL | Age: 49
Discharge: HOME | End: 2024-05-13
Payer: COMMERCIAL

## 2024-05-13 ENCOUNTER — OFFICE VISIT (OUTPATIENT)
Dept: SURGERY | Facility: CLINIC | Age: 49
End: 2024-05-13
Payer: COMMERCIAL

## 2024-05-13 VITALS
OXYGEN SATURATION: 100 % | HEART RATE: 63 BPM | BODY MASS INDEX: 24.25 KG/M2 | RESPIRATION RATE: 17 BRPM | DIASTOLIC BLOOD PRESSURE: 80 MMHG | WEIGHT: 160 LBS | SYSTOLIC BLOOD PRESSURE: 120 MMHG | HEIGHT: 68 IN

## 2024-05-13 DIAGNOSIS — N64.52 DISCHARGE FROM RIGHT NIPPLE: Primary | ICD-10-CM

## 2024-05-13 DIAGNOSIS — R92.8 ABNORMAL FINDING ON BREAST IMAGING: ICD-10-CM

## 2024-05-13 PROCEDURE — 1160F RVW MEDS BY RX/DR IN RCRD: CPT | Performed by: SURGERY

## 2024-05-13 PROCEDURE — 1159F MED LIST DOCD IN RCRD: CPT | Performed by: SURGERY

## 2024-05-13 PROCEDURE — 99204 OFFICE O/P NEW MOD 45 MIN: CPT | Performed by: SURGERY

## 2024-05-13 RX ORDER — CETIRIZINE HYDROCHLORIDE 10 MG/1
10 TABLET ORAL DAILY
COMMUNITY

## 2024-05-13 RX ORDER — CETIRIZINE HYDROCHLORIDE 5 MG/1
10 TABLET ORAL DAILY
COMMUNITY
End: 2024-05-13

## 2024-05-13 NOTE — LETTER
May 13, 2024       No Recipients    Patient: Rossi Osullivan   YOB: 1975   Date of Visit: 2024     Dear KELSI Marlow:       Thank you for referring Rossi Osullivan to me for evaluation. Below are the relevant portions of my assessment and plan of care.    If you have questions, please do not hesitate to call me. I look forward to following Rossi along with you.         Sincerely,        Homa Aquino MD        CC:   No Recipients    Homa Aquino MD  24 1410  Sign when Signing Visit  [facility]     Referring Provider: KELSI Marlow     Chief complaint:  Right nipple discharge      Subjective  HPI: Ms. Rossi Osullivan is a 49 yo woman, seen at the request of KELSI Marlow, for evaluation of right nipple discharge.  She has had right nipple discharge on and off for the last 6 months.  This mainly happens after a shower or when she squeezes her breast with fairly significant pressure.  She says she is also seen it in her bra occasionally.  She also has been having right breast pain for the past 6 months.  She admits she has not been wearing a bra regularly since she has been working from home.  Right ultrasound showed a dilated duct with some debris.  MRI showed a likely incidental enhancing focus abutting the areolar skin. Her work-up is detailed in the breast history section below.     Past medical history is significant for multiple comorbidities including lupus, hypothyroidism, anxiety/depression, migraines. She denies any prior history of abnormal mammograms or breast biopsies. She currently smokes 1 pack/day. She denies any family history of breast or ovarian cancer. She was joined today in clinic by her boyfriend.       I personally reviewed her records and summarized her relevant breast history/imagin2023, Screening MMG with [facility] ([facility]):  Scattered areas of fibroglandular density.  No suspicious mass, area of architectural distortion or suspicious microcalcification is identified.  BI-RADS 1: Negative    04/11/2024, Bilateral Diagnostic MMG with [facility] & Right Limited Breast US ( [facility]):  MMG:  There are scattered areas of fibroglandular density. There is stability of the fibroglandular pattern when compared to the prior examination. There are no new masses, areas of architectural distortion, or  suspicious microcalcification. There is no mammographic abnormality identified within the retro areolar region of the right breast to explain patient's nipple discharge.  US:  At the 8 o'clock position 3 cm from the nipple there is a dilated debris-filled duct which extends to  the nipple. There is no evidence of internal mass or abnormal vascular flow within the duct.  BI-RADS 2: Benign      5/7/24, Bilateral Breast MRI ( [facility]):  RIGHT BREAST:    There is intrinsic T1 hyperintensity within a few ducts in the anterior right breast, consistent with proteinaceous and/or hemorrhagic contents. At 5-6 o'clock in the anterior subareolar right breast, 0.3 cm from the base of the nipple and abutting the overlying skin anteriorly, there is a 0.3 cm enhancing focus, which is probably benign. Otherwise, no suspicious enhancing mass or area of non-mass enhancement is identified.  The visualized axilla is within normal limits.  LEFT BREAST:    No suspicious enhancing mass or area of non-mass enhancement is identified.  The visualized axilla is within normal limits.   EXTRAMAMMARY FINDINGS:  There are no pathologically enlarged internal mammary chain lymph nodes on either side.    There is trace bilateral pleural fluid.  BI-RADS Category 3: Probably benign        Review of Systems   Constitutional:  Positive for appetite change, chills, diaphoresis, fatigue, fever and unexpected weight change (loss 30lbs-unsure reason).   HENT:   Positive for hearing loss, lump/mass, mouth sores, nosebleeds, tinnitus, trouble  swallowing and voice change. Negative for sore throat.    Eyes:  Positive for eye problems (blurry vision). Negative for icterus.   Respiratory:  Positive for chest tightness, shortness of breath and wheezing (laying flat and with exertion). Negative for cough and hemoptysis.    Cardiovascular:  Positive for chest pain, leg swelling and palpitations.   Gastrointestinal:  Positive for abdominal distention, abdominal pain, blood in stool, constipation, diarrhea and nausea. Negative for rectal pain and vomiting.   Endocrine: Negative for hot flashes.   Genitourinary:  Negative for bladder incontinence, difficulty urinating, dyspareunia, dysuria, frequency, hematuria, menstrual problem, nocturia, pelvic pain, vaginal bleeding and vaginal discharge.    Musculoskeletal:  Positive for arthralgias, back pain, flank pain, gait problem and myalgias. Negative for neck pain and neck stiffness.   Skin:  Negative for itching, rash and wound.   Neurological:  Positive for dizziness, extremity weakness, gait problem, light-headedness (Recent recurrent migraines) and speech difficulty. Negative for headaches, numbness and seizures.   Hematological:  Negative for adenopathy. Bruises/bleeds easily.   Psychiatric/Behavioral:  Positive for confusion, decreased concentration (PTSD), depression and sleep disturbance. Negative for suicidal ideas. The patient is not nervous/anxious.        Medications:    Current Outpatient Medications:   •  Aspirin-Acetaminophen-Caffeine (EXCEDRIN PO), Take  by mouth., Disp: , Rfl:   •  atomoxetine (Strattera) 80 MG capsule, Take 1 capsule by mouth Daily. (Patient not taking: Reported on 4/29/2024), Disp: , Rfl:   •  cetirizine (ZyrTEC Allergy) 10 MG tablet, Take 1 tablet by mouth Daily., Disp: , Rfl:   •  clonazePAM (KlonoPIN) 1 MG tablet, Take 1 tablet by mouth 3 (Three) Times a Day., Disp: , Rfl:   •  hydroxychloroquine (PLAQUENIL) 200 MG tablet, As Needed. (Patient not taking: Reported on 4/29/2024),  Disp: , Rfl:   •  Ibuprofen 200 MG capsule, Take 1 capsule by mouth As Needed., Disp: , Rfl:   •  Iron-Vitamins (GERITOL COMPLETE PO), Take  by mouth., Disp: , Rfl:   •  levothyroxine (SYNTHROID, LEVOTHROID) 50 MCG tablet, TAKE 1 TABLET BY MOUTH EVERY MORNING., Disp: 90 tablet, Rfl: 0  •  lisdexamfetamine (Vyvanse) 50 MG capsule, Take 1 capsule by mouth Every Morning, Disp: , Rfl:   •  Probiotic Product (PROBIOTIC DAILY PO), Take  by mouth., Disp: , Rfl:       Allergies   Allergen Reactions   • Cinnamon Anaphylaxis   • Latex Anaphylaxis   • Penicillins Anaphylaxis   • Vancocin Hcl [Vancomycin] Swelling   • Other Nausea And Vomiting     GENERAL ANESTHESIA    • Zinc Oxide Angioedema   • Bee Venom Unknown - Low Severity       Past Medical History:   Diagnosis Date   • Anxiety    • Depression    • Generalized anxiety disorder    • GI problem    • Hypotension    • Hypothyroidism    • Mitral valve prolapse    • PTSD (post-traumatic stress disorder)    • Supraventricular tachycardia    • Syncope    • Systemic lupus erythematosus    • Tricuspid valve regurgitation        Past Surgical History:   Procedure Laterality Date   •  SECTION     • CHOLECYSTECTOMY     • DILATION AND CURETTAGE, DIAGNOSTIC / THERAPEUTIC     • ENDOSCOPY      x4   • HYSTERECTOMY  2007    still has right ovary    • KNEE ARTHROSCOPY Left    • MOUTH SURGERY     • THYROIDECTOMY      partial       Family History   Problem Relation Age of Onset   • Aortic aneurysm Mother    • Cancer Mother         disocered at stage !V   • Anxiety disorder Mother    • Depression Mother        Social History     Socioeconomic History   • Marital status:    • Number of children: 2   Tobacco Use   • Smoking status: Every Day     Average packs/day: 1 pack/day for 17.0 years (17.0 ttl pk-yrs)     Types: Cigarettes     Start date:      Passive exposure: Past   • Smokeless tobacco: Never   Vaping Use   • Vaping status: Never Used   Substance and Sexual  Activity   • Alcohol use: Yes     Comment: OCC-2 every year   • Drug use: Never   • Sexual activity: Yes     Patient drinks 1 servings of caffeine per day.       GYNECOLOGIC HISTORY:   G: 3. P: 2. AB: 1.  Last menstrual period: 7958-6190, sp SHANTELL/USO   Age at menarche: 10  Age at first childbirth: 22  Lactation/How lon weeks  Age at menopause: unknown  Total years of oral contraceptive use: unsure  Total years of hormone replacement therapy: 0      Objective  PHYSICAL EXAMINATION  Vitals:    24 1327   BP: 120/80   Pulse: 63   Resp: 17   SpO2: 100%     ECOG 0 - Asymptomatic  General: NAD, well appearing  Psych: a&o x 3, normal mood and affect  Eyes: EOMI, no scleral icterus  ENMT: neck supple without masses or thyromegaly, mucus membranes moist  Resp: normal effort, CTAB  CV: RRR, no murmurs, no edema  GI: soft, NT, ND  MSK: normal gait, normal ROM in bilateral shoulders  Lymph nodes: no cervical, supraclavicular or axillary lymphadenopathy  Breast: symmetric, moderate size, grade 3 ptosis  Right: No visible abnormalities on inspection while seated, with arms raised or hands on hips. No masses, skin changes, or nipple abnormalities.  No discharge with firm palpation.  Left: No visible abnormalities on inspection while seated, with arms raised or hands on hips. No masses, skin changes, or nipple abnormalities.  No discharge with firm palpation.      I have independently reviewed her imaging and here are my findings:   Right breast ultrasound shows a dilated duct with some internal debris.  MRI shows a likely incidental 3 mm enhancing focus abutting the areola skin just medial/inferior to the nipple papilla.      Assessment & Plan  Assessment:   1. 48 y.o. F with a right nipple discharge, which appears to be physiologic.  2.  She has a probably benign 3 mm enhancing focus just deep to the right areola on MRI.  3.  She has right breast pain, likely related to not wearing a bra and smoking.    Discussion:  We  discussed pathologic versus physiologic nipple discharge.  The discharge she describes mainly occurs after showers and when she tries to elicit it.  I could not elicit any discharge on physical exam today.  I have asked her to stop stimulating her nipple as this will often encourage physiologic discharge to persist.  I explained that many women can have dilated ducts with internal debris on imaging even without having discharge and that this in and of itself is not a concerning finding. The 3 mm mass seen on MRI is probably incidental and unrelated to the nipple discharge.  We discussed the process of imaging surveillance.    We also discussed her right breast pain and how it is probably unrelated to the nipple discharge.  She is going to try wearing a sports bra day and night.  She also understands that her smoking is probably contributing.    Plan:  -Pain management as described above.  -Follow-up in 11/2024 with breast MRI with NP.  -She will call and let us know if her discharge becomes spontaneous, bloody, or clear.    Homa Aquino MD    I spent 45 minutes caring for Gay on this date of service. This time includes time spent by me in the following activities: preparing for the visit, performing a medically appropriate examination and/or evaluation , counseling and educating the patient/family/caregiver, ordering medications, tests, or procedures, documenting information in the medical record, and independently interpreting results and communicating that information with the patient/family/caregiver.      CC:  Gail Carlson, APRN

## 2024-05-13 NOTE — PROGRESS NOTES
[facility]     Referring Provider: KELSI Marlow     Chief complaint:  Right nipple discharge      Subjective   HPI: Ms. Rossi Osullivan is a 49 yo woman, seen at the request of KELSI Marlow, for evaluation of right nipple discharge.  She has had right nipple discharge on and off for the last 6 months.  This mainly happens after a shower or when she squeezes her breast with fairly significant pressure.  She says she is also seen it in her bra occasionally.  She also has been having right breast pain for the past 6 months.  She admits she has not been wearing a bra regularly since she has been working from home.  Right ultrasound showed a dilated duct with some debris.  MRI showed a likely incidental enhancing focus abutting the areolar skin. Her work-up is detailed in the breast history section below.     Past medical history is significant for multiple comorbidities including lupus, hypothyroidism, anxiety/depression, migraines. She denies any prior history of abnormal mammograms or breast biopsies. She currently smokes 1 pack/day. She denies any family history of breast or ovarian cancer. She was joined today in clinic by her boyfriend.       I personally reviewed her records and summarized her relevant breast history/imagin2023, Screening MMG with [facility] ( [facility]):  Scattered areas of fibroglandular density. No suspicious mass, area of architectural distortion or suspicious microcalcification is identified.  BI-RADS 1: Negative    2024, Bilateral Diagnostic MMG with [facility] & Right Limited Breast US ( [facility]):  MMG:  There are scattered areas of fibroglandular density. There is stability of the fibroglandular pattern when compared to the prior examination. There are no new masses, areas of architectural distortion, or  suspicious microcalcification. There is no mammographic abnormality identified within the retro areolar region of the right  breast to explain patient's nipple discharge.  US:  At the 8 o'clock position 3 cm from the nipple there is a dilated debris-filled duct which extends to  the nipple. There is no evidence of internal mass or abnormal vascular flow within the duct.  BI-RADS 2: Benign      5/7/24, Bilateral Breast MRI ([facility]):  RIGHT BREAST:    There is intrinsic T1 hyperintensity within a few ducts in the anterior right breast, consistent with proteinaceous and/or hemorrhagic contents. At 5-6 o'clock in the anterior subareolar right breast, 0.3 cm from the base of the nipple and abutting the overlying skin anteriorly, there is a 0.3 cm enhancing focus, which is probably benign. Otherwise, no suspicious enhancing mass or area of non-mass enhancement is identified.  The visualized axilla is within normal limits.  LEFT BREAST:    No suspicious enhancing mass or area of non-mass enhancement is identified.  The visualized axilla is within normal limits.   EXTRAMAMMARY FINDINGS:  There are no pathologically enlarged internal mammary chain lymph nodes on either side.    There is trace bilateral pleural fluid.  BI-RADS Category 3: Probably benign        Review of Systems   Constitutional:  Positive for appetite change, chills, diaphoresis, fatigue, fever and unexpected weight change (loss 30lbs-unsure reason).   HENT:   Positive for hearing loss, lump/mass, mouth sores, nosebleeds, tinnitus, trouble swallowing and voice change. Negative for sore throat.    Eyes:  Positive for eye problems (blurry vision). Negative for icterus.   Respiratory:  Positive for chest tightness, shortness of breath and wheezing (laying flat and with exertion). Negative for cough and hemoptysis.    Cardiovascular:  Positive for chest pain, leg swelling and palpitations.   Gastrointestinal:  Positive for abdominal distention, abdominal pain, blood in stool, constipation, diarrhea and nausea. Negative for rectal pain and vomiting.   Endocrine: Negative for hot  flashes.   Genitourinary:  Negative for bladder incontinence, difficulty urinating, dyspareunia, dysuria, frequency, hematuria, menstrual problem, nocturia, pelvic pain, vaginal bleeding and vaginal discharge.    Musculoskeletal:  Positive for arthralgias, back pain, flank pain, gait problem and myalgias. Negative for neck pain and neck stiffness.   Skin:  Negative for itching, rash and wound.   Neurological:  Positive for dizziness, extremity weakness, gait problem, light-headedness (Recent recurrent migraines) and speech difficulty. Negative for headaches, numbness and seizures.   Hematological:  Negative for adenopathy. Bruises/bleeds easily.   Psychiatric/Behavioral:  Positive for confusion, decreased concentration (PTSD), depression and sleep disturbance. Negative for suicidal ideas. The patient is not nervous/anxious.        Medications:    Current Outpatient Medications:     Aspirin-Acetaminophen-Caffeine (EXCEDRIN PO), Take  by mouth., Disp: , Rfl:     atomoxetine (Strattera) 80 MG capsule, Take 1 capsule by mouth Daily. (Patient not taking: Reported on 4/29/2024), Disp: , Rfl:     cetirizine (ZyrTEC Allergy) 10 MG tablet, Take 1 tablet by mouth Daily., Disp: , Rfl:     clonazePAM (KlonoPIN) 1 MG tablet, Take 1 tablet by mouth 3 (Three) Times a Day., Disp: , Rfl:     hydroxychloroquine (PLAQUENIL) 200 MG tablet, As Needed. (Patient not taking: Reported on 4/29/2024), Disp: , Rfl:     Ibuprofen 200 MG capsule, Take 1 capsule by mouth As Needed., Disp: , Rfl:     Iron-Vitamins (GERITOL COMPLETE PO), Take  by mouth., Disp: , Rfl:     levothyroxine (SYNTHROID, LEVOTHROID) 50 MCG tablet, TAKE 1 TABLET BY MOUTH EVERY MORNING., Disp: 90 tablet, Rfl: 0    lisdexamfetamine (Vyvanse) 50 MG capsule, Take 1 capsule by mouth Every Morning, Disp: , Rfl:     Probiotic Product (PROBIOTIC DAILY PO), Take  by mouth., Disp: , Rfl:       Allergies   Allergen Reactions    Cinnamon Anaphylaxis    Latex Anaphylaxis    Penicillins  Anaphylaxis    Vancocin Hcl [Vancomycin] Swelling    Other Nausea And Vomiting     GENERAL ANESTHESIA     Zinc Oxide Angioedema    Bee Venom Unknown - Low Severity       Past Medical History:   Diagnosis Date    Anxiety     Depression     Generalized anxiety disorder     GI problem     Hypotension     Hypothyroidism     Mitral valve prolapse     PTSD (post-traumatic stress disorder)     Supraventricular tachycardia     Syncope     Systemic lupus erythematosus     Tricuspid valve regurgitation        Past Surgical History:   Procedure Laterality Date     SECTION      CHOLECYSTECTOMY      DILATION AND CURETTAGE, DIAGNOSTIC / THERAPEUTIC      ENDOSCOPY      x4    HYSTERECTOMY  2007    still has right ovary     KNEE ARTHROSCOPY Left     MOUTH SURGERY      THYROIDECTOMY      partial       Family History   Problem Relation Age of Onset    Aortic aneurysm Mother     Cancer Mother         disocered at stage !V    Anxiety disorder Mother     Depression Mother        Social History     Socioeconomic History    Marital status:     Number of children: 2   Tobacco Use    Smoking status: Every Day     Average packs/day: 1 pack/day for 17.0 years (17.0 ttl pk-yrs)     Types: Cigarettes     Start date:      Passive exposure: Past    Smokeless tobacco: Never   Vaping Use    Vaping status: Never Used   Substance and Sexual Activity    Alcohol use: Yes     Comment: OCC-2 every year    Drug use: Never    Sexual activity: Yes     Patient drinks 1 servings of caffeine per day.       GYNECOLOGIC HISTORY:   G: 3. P: 2. AB: 1.  Last menstrual period: [id number], sp SHANTELL/USO   Age at menarche: 10  Age at first childbirth: 22  Lactation/How lon weeks  Age at menopause: unknown  Total years of oral contraceptive use: unsure  Total years of hormone replacement therapy: 0      Objective   PHYSICAL EXAMINATION  Vitals:    24 1327   BP: 120/80   Pulse: 63   Resp: 17   SpO2: 100%     ECOG 0 - Asymptomatic  General:  NAD, well appearing  Psych: a&o x 3, normal mood and affect  Eyes: EOMI, no scleral icterus  ENMT: neck supple without masses or thyromegaly, mucus membranes moist  Resp: normal effort, CTAB  CV: RRR, no murmurs, no edema  GI: soft, NT, ND  MSK: normal gait, normal ROM in bilateral shoulders  Lymph nodes: no cervical, supraclavicular or axillary lymphadenopathy  Breast: symmetric, moderate size, grade 3 ptosis  Right: No visible abnormalities on inspection while seated, with arms raised or hands on hips. No masses, skin changes, or nipple abnormalities.  No discharge with firm palpation.  Left: No visible abnormalities on inspection while seated, with arms raised or hands on hips. No masses, skin changes, or nipple abnormalities.  No discharge with firm palpation.      I have independently reviewed her imaging and here are my findings:   Right breast ultrasound shows a dilated duct with some internal debris.  MRI shows a likely incidental 3 mm enhancing focus abutting the areola skin just medial/inferior to the nipple papilla.      Assessment & Plan   Assessment:   1. 48 y.o. F with a right nipple discharge, which appears to be physiologic.  2.  She has a probably benign 3 mm enhancing focus just deep to the right areola on MRI.  3.  She has right breast pain, likely related to not wearing a bra and smoking.    Discussion:  We discussed pathologic versus physiologic nipple discharge.  The discharge she describes mainly occurs after showers and when she tries to elicit it.  I could not elicit any discharge on physical exam today.  I have asked her to stop stimulating her nipple as this will often encourage physiologic discharge to persist.  I explained that many women can have dilated ducts with internal debris on imaging even without having discharge and that this in and of itself is not a concerning finding. The 3 mm mass seen on MRI is probably incidental and unrelated to the nipple discharge.  We discussed the  process of imaging surveillance.    We also discussed her right breast pain and how it is probably unrelated to the nipple discharge.  She is going to try wearing a sports bra day and night.  She also understands that her smoking is probably contributing.    Plan:  -Pain management as described above.  -Follow-up in 11/2024 with breast MRI with NP.  -She will call and let us know if her discharge becomes spontaneous, bloody, or clear.    Homa Aquino MD    I spent 45 minutes caring for Gay on this date of service. This time includes time spent by me in the following activities: preparing for the visit, performing a medically appropriate examination and/or evaluation , counseling and educating the patient/family/caregiver, ordering medications, tests, or procedures, documenting information in the medical record, and independently interpreting results and communicating that information with the patient/family/caregiver.      CC:  Gail Carlson, APRN

## 2024-06-03 ENCOUNTER — HOSPITAL ENCOUNTER (OUTPATIENT)
Dept: CARDIOLOGY | Facility: HOSPITAL | Age: 49
Discharge: HOME | End: 2024-06-03
Admitting: INTERNAL MEDICINE
Payer: COMMERCIAL

## 2024-06-03 DIAGNOSIS — R42 DIZZINESS: ICD-10-CM

## 2024-06-03 LAB
BH CV ECHO MEAS - AO ROOT DIAM: 1.96 CM
BH CV ECHO MEAS - EDV(CUBED): 101 ML
BH CV ECHO MEAS - EDV(MOD-SP2): 53.3 ML
BH CV ECHO MEAS - EDV(MOD-SP4): 51.5 ML
BH CV ECHO MEAS - EF(MOD-BP): 64.1 %
BH CV ECHO MEAS - EF(MOD-SP2): 72.2 %
BH CV ECHO MEAS - EF(MOD-SP4): 57.3 %
BH CV ECHO MEAS - ESV(CUBED): 24.5 ML
BH CV ECHO MEAS - ESV(MOD-SP2): 14.8 ML
BH CV ECHO MEAS - ESV(MOD-SP4): 22 ML
BH CV ECHO MEAS - FS: 37.6 %
BH CV ECHO MEAS - IVS/LVPW: 0.9 CM
BH CV ECHO MEAS - IVSD: 0.74 CM
BH CV ECHO MEAS - LA DIMENSION: 3.4 CM
BH CV ECHO MEAS - LAT PEAK E' VEL: 13.7 CM/SEC
BH CV ECHO MEAS - LV DIASTOLIC VOL/BSA (35-75): 27.1 CM2
BH CV ECHO MEAS - LV MASS(C)D: 116 GRAMS
BH CV ECHO MEAS - LV SYSTOLIC VOL/BSA (12-30): 11.6 CM2
BH CV ECHO MEAS - LVIDD: 4.7 CM
BH CV ECHO MEAS - LVIDS: 2.9 CM
BH CV ECHO MEAS - LVPWD: 0.82 CM
BH CV ECHO MEAS - MED PEAK E' VEL: 7.4 CM/SEC
BH CV ECHO MEAS - MV A MAX VEL: 78 CM/SEC
BH CV ECHO MEAS - MV DEC SLOPE: 633.5 CM/SEC2
BH CV ECHO MEAS - MV DEC TIME: 0.11 SEC
BH CV ECHO MEAS - MV E MAX VEL: 70.3 CM/SEC
BH CV ECHO MEAS - MV E/A: 0.9
BH CV ECHO MEAS - RVDD: 1.72 CM
BH CV ECHO MEAS - SV(MOD-SP2): 38.5 ML
BH CV ECHO MEAS - SV(MOD-SP4): 29.5 ML
BH CV ECHO MEAS - SVI(MOD-SP2): 20.3 ML/M2
BH CV ECHO MEAS - SVI(MOD-SP4): 15.5 ML/M2
BH CV ECHO MEASUREMENTS AVERAGE E/E' RATIO: 6.66
LEFT ATRIUM VOLUME INDEX: 14.2 ML/M2

## 2024-06-03 PROCEDURE — 93306 TTE W/DOPPLER COMPLETE: CPT

## 2024-06-03 PROCEDURE — 93306 TTE W/DOPPLER COMPLETE: CPT | Performed by: INTERNAL MEDICINE

## 2024-06-05 ENCOUNTER — TELEPHONE (OUTPATIENT)
Dept: CARDIOLOGY | Facility: CLINIC | Age: 49
End: 2024-06-05
Payer: COMMERCIAL

## 2024-06-05 NOTE — TELEPHONE ENCOUNTER
Spoke with pt and adv per Dr. Jorge :Echocardiogram reviewed.  Normal heart function.  There is very minor leaking of the mitral valve which should not cause any clinical symptoms.     Can do an annual follow-up, if she needs a sooner have her notify the office.         Pt verb understanding and no questions asked

## 2024-06-05 NOTE — TELEPHONE ENCOUNTER
----- Message from Zena Cantrell sent at 6/5/2024  9:52 AM EDT -----  Echocardiogram reviewed.  Normal heart function.  There is very minor leaking of the mitral valve which should not cause any clinical symptoms.    Can do an annual follow-up, if she needs a sooner have her notify the office.

## 2024-06-11 ENCOUNTER — OFFICE VISIT (OUTPATIENT)
Dept: CARDIOLOGY | Facility: CLINIC | Age: 49
End: 2024-06-11
Payer: COMMERCIAL

## 2024-06-11 ENCOUNTER — TELEPHONE (OUTPATIENT)
Dept: CARDIOLOGY | Facility: CLINIC | Age: 49
End: 2024-06-11
Payer: COMMERCIAL

## 2024-06-11 VITALS
DIASTOLIC BLOOD PRESSURE: 72 MMHG | BODY MASS INDEX: 24.55 KG/M2 | HEIGHT: 68 IN | HEART RATE: 86 BPM | WEIGHT: 162 LBS | SYSTOLIC BLOOD PRESSURE: 105 MMHG

## 2024-06-11 DIAGNOSIS — G90.9 AUTONOMIC DYSFUNCTION: Primary | ICD-10-CM

## 2024-06-11 DIAGNOSIS — F17.200 SMOKER: ICD-10-CM

## 2024-06-11 DIAGNOSIS — R42 DIZZINESS: ICD-10-CM

## 2024-06-11 PROCEDURE — 1159F MED LIST DOCD IN RCRD: CPT | Performed by: NURSE PRACTITIONER

## 2024-06-11 PROCEDURE — 99214 OFFICE O/P EST MOD 30 MIN: CPT | Performed by: NURSE PRACTITIONER

## 2024-06-11 PROCEDURE — 1160F RVW MEDS BY RX/DR IN RCRD: CPT | Performed by: NURSE PRACTITIONER

## 2024-06-11 RX ORDER — PANTOPRAZOLE SODIUM 40 MG/1
40 TABLET, DELAYED RELEASE ORAL DAILY
COMMUNITY
Start: 2024-05-09

## 2024-06-11 NOTE — TELEPHONE ENCOUNTER
Procedure: Colonoscopy and/or EGD     Med Directive: NA     PMH: Dizziness      Last Seen: 4/29/24  ECHO 6/3/24 normal

## 2024-06-11 NOTE — PROGRESS NOTES
Chief Complaint  Follow-up and f/u echo    Subjective            Rossi Osullivan presents to [facility] CARDIOLOGY  History of Present Illness    Rossi is here for testing follow-up.  She had an echocardiogram that showed normal systolic function ejection fraction 61 to 65% with grade 1 diastolic dysfunction and trace mitral and tricuspid regurgitation.  She has a longstanding history of autonomic dysfunction with previous abnormal tilt table testing.  She also reports a history of mitral valve prolapse and SVT.  She reports history of lupus,Sjogren, migraines, etc.  She has episodes of dizziness and presyncope as well as significant fatigue throughout the day.  She has low blood pressure and has not been able to tolerate a beta-blocker in the past.    PMH  Past Medical History:   Diagnosis Date    Anxiety     Depression     Generalized anxiety disorder     GI problem     Hypotension     Hypothyroidism     Mitral valve prolapse     PTSD (post-traumatic stress disorder)     Supraventricular tachycardia     Syncope     Systemic lupus erythematosus     Tricuspid valve regurgitation          SURGICALHX  Past Surgical History:   Procedure Laterality Date     SECTION      CHOLECYSTECTOMY      DILATION AND CURETTAGE, DIAGNOSTIC / THERAPEUTIC      ENDOSCOPY      x4    HYSTERECTOMY  2007    still has right ovary     KNEE ARTHROSCOPY Left     MOUTH SURGERY      THYROIDECTOMY      partial        SOC  Social History     Socioeconomic History    Marital status:     Number of children: 2   Tobacco Use    Smoking status: Every Day     Average packs/day: 1 pack/day for 17.0 years (17.0 ttl pk-yrs)     Types: Cigarettes     Start date:      Passive exposure: Past    Smokeless tobacco: Never   Vaping Use    Vaping status: Never Used   Substance and Sexual Activity    Alcohol use: Yes     Comment: OCC-2 every year    Drug use: Never    Sexual activity: Yes         FAMHX  Family History  "  Problem Relation Age of Onset    Aortic aneurysm Mother     Cancer Mother         Discovered at stage IV    Anxiety disorder Mother     Depression Mother           ALLERGY  Allergies   Allergen Reactions    Cinnamon Anaphylaxis    Latex Anaphylaxis    Penicillins Anaphylaxis    Vancocin Hcl [Vancomycin] Swelling    Other Nausea And Vomiting     GENERAL ANESTHESIA     Zinc Oxide Angioedema    Bee Venom Unknown - Low Severity        MEDSCURRENT    Current Outpatient Medications:     Aspirin-Acetaminophen-Caffeine (EXCEDRIN PO), Take  by mouth., Disp: , Rfl:     cetirizine (ZyrTEC Allergy) 10 MG tablet, Take 1 tablet by mouth Daily., Disp: , Rfl:     clonazePAM (KlonoPIN) 1 MG tablet, Take 1 tablet by mouth 3 (Three) Times a Day., Disp: , Rfl:     Ibuprofen 200 MG capsule, Take 1 capsule by mouth As Needed., Disp: , Rfl:     Iron-Vitamins (GERITOL COMPLETE PO), Take  by mouth., Disp: , Rfl:     levothyroxine (SYNTHROID, LEVOTHROID) 50 MCG tablet, TAKE 1 TABLET BY MOUTH EVERY MORNING., Disp: 90 tablet, Rfl: 0    lisdexamfetamine (VYVANSE) 60 MG capsule, Take 1 capsule by mouth Every Morning, Disp: , Rfl:     pantoprazole (PROTONIX) 40 MG EC tablet, Take 1 tablet by mouth Daily., Disp: , Rfl:     Probiotic Product (PROBIOTIC DAILY PO), Take  by mouth., Disp: , Rfl:       Review of Systems   Constitutional: Positive for malaise/fatigue.   Cardiovascular:  Positive for near-syncope and palpitations.   Neurological:  Positive for dizziness.        Objective     /72   Pulse 86   Ht 172.7 cm (68\")   Wt 73.5 kg (162 lb)   BMI 24.63 kg/m²       General Appearance:   well developed  well nourished  HENT:   oropharynx moist  lips not cyanotic  Neck:  thyroid not enlarged  supple  Respiratory:  no respiratory distress  normal breath sounds  no rales  Cardiovascular:  no jugular venous distention  regular rhythm  apical impulse normal  S1 normal, S2 normal  no S3, no S4   no murmur  no rub, no thrill  carotid pulses " normal; no bruit  pedal pulses normal  lower extremity edema: none    Musculoskeletal:  no clubbing of fingers.   normocephalic, head atraumatic  Skin:   warm, dry  Psychiatric:  judgement and insight appropriate  normal mood and affect      Result Review :     The following data was reviewed by: KELSI Pierre on 06/11/2024:    CMP          7/20/2023    10:22   CMP   Glucose 86    BUN 9    Creatinine 0.82    EGFR 89.5    Sodium 139    Potassium 4.5    Chloride 105    Calcium 9.4    Total Protein 7.1    Albumin 4.5    Globulin 2.6    Total Bilirubin 0.4    Alkaline Phosphatase 111    AST (SGOT) 21    ALT (SGPT) 21    Albumin/Globulin Ratio 1.7    BUN/Creatinine Ratio 11.0    Anion Gap 8.0      CBC          7/20/2023    10:22   CBC   WBC 4.23    RBC 4.72    Hemoglobin 14.9    Hematocrit 44.1    MCV 93.4    MCH 31.6    MCHC 33.8    RDW 13.0    Platelets 315      Lipid Panel          7/20/2023    10:22   Lipid Panel   Total Cholesterol 157    Triglycerides 90    HDL Cholesterol 38    VLDL Cholesterol 17    LDL Cholesterol  102    LDL/HDL Ratio 2.66      TSH          7/20/2023    10:22   TSH   TSH 3.190             Procedures      Rossi Osullivan  reports that she has been smoking cigarettes. She started smoking about 21 years ago. She has a 17 pack-year smoking history. She has been exposed to tobacco smoke. She has never used smokeless tobacco. I have educated her on the risk of diseases from using tobacco products such as cancer, COPD, and heart disease.     I advised her to quit and she is not willing to quit.    I spent 3  minutes counseling the patient.                Assessment and Plan        ASSESSMENT:  Encounter Diagnoses   Name Primary?    Autonomic dysfunction Yes    Dizziness     Smoker          PLAN:    1.  Autonomic dysfunction-with frequent episodes of dizziness and presyncope as well as fatigue.  She is wearing compression stockings and adequately hydrating.  Her echo did not show  any significant structural abnormality.  She is going to be referred to a neurologist by her PCP, the neurologist she previously worked with.  Will see if they specialize in autonomic dysfunction, if not she will reach out and we may consider [facility] referral.    Medical therapy such as midodrine may not be the best choice in this case due to baseline tachycardia and previous SVT.  Continue with lifestyle modifications and specialty clinic referral.  2.  Smoking counseled as above.  3.  Routine follow-up arranged.          Patient was given instructions and counseling regarding her condition or for health maintenance advice. Please see specific information pulled into the AVS if appropriate.           Zena Cantrell, APRN   6/11/2024  14:30 EDT

## 2024-07-08 ENCOUNTER — TRANSCRIBE ORDERS (OUTPATIENT)
Dept: ADMINISTRATIVE | Facility: HOSPITAL | Age: 49
End: 2024-07-08
Payer: COMMERCIAL

## 2024-07-08 ENCOUNTER — LAB (OUTPATIENT)
Dept: LAB | Facility: HOSPITAL | Age: 49
End: 2024-07-08
Payer: COMMERCIAL

## 2024-07-08 DIAGNOSIS — M25.50 ARTHRALGIA, UNSPECIFIED JOINT: ICD-10-CM

## 2024-07-08 DIAGNOSIS — Z79.899 ENCOUNTER FOR LONG-TERM (CURRENT) USE OF OTHER MEDICATIONS: Primary | ICD-10-CM

## 2024-07-08 DIAGNOSIS — E55.9 VITAMIN D DEFICIENCY: ICD-10-CM

## 2024-07-08 DIAGNOSIS — Z79.899 ENCOUNTER FOR LONG-TERM (CURRENT) USE OF OTHER MEDICATIONS: ICD-10-CM

## 2024-07-08 LAB
25(OH)D3 SERPL-MCNC: 28 NG/ML (ref 30–100)
BASOPHILS # BLD AUTO: 0.02 10*3/MM3 (ref 0–0.2)
BASOPHILS NFR BLD AUTO: 0.5 % (ref 0–1.5)
CHROMATIN AB SERPL-ACNC: <10 IU/ML (ref 0–14)
CK SERPL-CCNC: 35 U/L (ref 20–180)
CRP SERPL-MCNC: <0.3 MG/DL (ref 0–0.5)
DEPRECATED RDW RBC AUTO: 46.8 FL (ref 37–54)
EOSINOPHIL # BLD AUTO: 0.01 10*3/MM3 (ref 0–0.4)
EOSINOPHIL NFR BLD AUTO: 0.2 % (ref 0.3–6.2)
ERYTHROCYTE [DISTWIDTH] IN BLOOD BY AUTOMATED COUNT: 13.2 % (ref 12.3–15.4)
HCT VFR BLD AUTO: 41.8 % (ref 34–46.6)
HGB BLD-MCNC: 13.8 G/DL (ref 12–15.9)
IMM GRANULOCYTES # BLD AUTO: 0.01 10*3/MM3 (ref 0–0.05)
IMM GRANULOCYTES NFR BLD AUTO: 0.2 % (ref 0–0.5)
LYMPHOCYTES # BLD AUTO: 1.71 10*3/MM3 (ref 0.7–3.1)
LYMPHOCYTES NFR BLD AUTO: 41.4 % (ref 19.6–45.3)
MCH RBC QN AUTO: 31.4 PG (ref 26.6–33)
MCHC RBC AUTO-ENTMCNC: 33 G/DL (ref 31.5–35.7)
MCV RBC AUTO: 95 FL (ref 79–97)
MONOCYTES # BLD AUTO: 0.38 10*3/MM3 (ref 0.1–0.9)
MONOCYTES NFR BLD AUTO: 9.2 % (ref 5–12)
NEUTROPHILS NFR BLD AUTO: 2 10*3/MM3 (ref 1.7–7)
NEUTROPHILS NFR BLD AUTO: 48.5 % (ref 42.7–76)
PLATELET # BLD AUTO: 277 10*3/MM3 (ref 140–450)
PMV BLD AUTO: 9.7 FL (ref 6–12)
PROT ?TM UR-MCNC: 5.4 MG/DL
RBC # BLD AUTO: 4.4 10*6/MM3 (ref 3.77–5.28)
WBC NRBC COR # BLD AUTO: 4.13 10*3/MM3 (ref 3.4–10.8)

## 2024-07-08 PROCEDURE — 81374 HLA I TYPING 1 ANTIGEN LR: CPT

## 2024-07-08 PROCEDURE — 85025 COMPLETE CBC W/AUTO DIFF WBC: CPT

## 2024-07-08 PROCEDURE — 86235 NUCLEAR ANTIGEN ANTIBODY: CPT

## 2024-07-08 PROCEDURE — 82550 ASSAY OF CK (CPK): CPT

## 2024-07-08 PROCEDURE — 82306 VITAMIN D 25 HYDROXY: CPT

## 2024-07-08 PROCEDURE — 86140 C-REACTIVE PROTEIN: CPT

## 2024-07-08 PROCEDURE — 86431 RHEUMATOID FACTOR QUANT: CPT

## 2024-07-08 PROCEDURE — 84156 ASSAY OF PROTEIN URINE: CPT

## 2024-07-08 PROCEDURE — 86200 CCP ANTIBODY: CPT

## 2024-07-08 PROCEDURE — 86038 ANTINUCLEAR ANTIBODIES: CPT

## 2024-07-08 PROCEDURE — 36415 COLL VENOUS BLD VENIPUNCTURE: CPT

## 2024-07-10 LAB
CCP IGA+IGG SERPL IA-ACNC: 5 UNITS (ref 0–19)
CHROMATIN AB SERPL-ACNC: 2.9 AI (ref 0–0.9)
DSDNA AB SER-ACNC: 10 IU/ML (ref 0–9)
ENA RNP AB SER-ACNC: 3.4 AI (ref 0–0.9)
ENA SM AB SER-ACNC: 0.8 AI (ref 0–0.9)
ENA SS-A AB SER-ACNC: <0.2 AI (ref 0–0.9)
ENA SS-B AB SER-ACNC: <0.2 AI (ref 0–0.9)
RHEUMATOID FACT SERPL-ACNC: <10 IU/ML

## 2024-07-11 LAB
ANA SER QL IF: POSITIVE
ANA SPECKLED TITR SER: ABNORMAL {TITER}
Lab: ABNORMAL

## 2024-07-19 LAB — HLA-B27 QL NAA+PROBE: NEGATIVE

## 2024-10-11 ENCOUNTER — TELEPHONE (OUTPATIENT)
Dept: CARDIOLOGY | Facility: CLINIC | Age: 49
End: 2024-10-11
Payer: COMMERCIAL

## 2024-10-11 NOTE — TELEPHONE ENCOUNTER
SW patient. Patient called concerned about neurologist. Neurologist does not agree with diagnosis and has referred patient to cardiologist in [place]. Advised patient that she can get a second opinion. Patient does not feel this is necessary. Patient requested this be sent to KELSI Kennedy.

## 2024-10-17 DIAGNOSIS — G90.9 AUTONOMIC DYSFUNCTION: Primary | ICD-10-CM

## 2024-11-14 ENCOUNTER — HOSPITAL ENCOUNTER (OUTPATIENT)
Dept: MRI IMAGING | Facility: HOSPITAL | Age: 49
Discharge: HOME | End: 2024-11-14
Admitting: SURGERY
Payer: COMMERCIAL

## 2024-11-14 DIAGNOSIS — R92.8 ABNORMAL FINDING ON BREAST IMAGING: ICD-10-CM

## 2024-11-14 PROCEDURE — A9577 INJ MULTIHANCE: HCPCS | Performed by: SURGERY

## 2024-11-14 PROCEDURE — 77049 MRI BREAST C-+ W/CAD BI: CPT

## 2024-11-14 PROCEDURE — 25510000002 GADOBENATE DIMEGLUMINE 529 MG/ML SOLUTION: Performed by: SURGERY

## 2024-11-14 RX ADMIN — GADOBENATE DIMEGLUMINE 14 ML: 529 INJECTION, SOLUTION INTRAVENOUS at 15:01

## 2024-11-15 NOTE — PROGRESS NOTES
[facility]     Referring Provider: KELSI Marlow     Chief complaint:  Right nipple discharge      Subjective   HPI: Ms. Rossi Osullivan is a 49 yo woman, seen at the request of KELSI Marlow, for evaluation of right nipple discharge.  She has had right nipple discharge on and off for the last 6 months.  This mainly happens after a shower or when she squeezes her breast with fairly significant pressure.  She says she is also seen it in her bra occasionally.  She also has been having right breast pain for the past 6 months.  She admits she has not been wearing a bra regularly since she has been working from home.  Right ultrasound showed a dilated duct with some debris.  MRI showed a likely incidental enhancing focus abutting the areolar skin. Her work-up is detailed in the breast history section below.     Past medical history is significant for multiple comorbidities including lupus, hypothyroidism, anxiety/depression, migraines. She denies any prior history of abnormal mammograms or breast biopsies. She currently smokes 1 pack/day. She denies any family history of breast or ovarian cancer. She was joined today in clinic by her boyfriend.     2024 interval history  Patient presenting to the office today for routine follow-up.  On 2024 she had a bilateral breast MRI that resulted as BI-RADS 3 for a probably benign 0.3 cm enhancing focus in the subareolar right breast that has not changed.  They recommended a follow-up breast MRI in 6 to 12 months.    I personally reviewed her records and summarized her relevant breast history/imagin2023, Screening MMG with [facility] ( [facility]):  Scattered areas of fibroglandular density. No suspicious mass, area of architectural distortion or suspicious microcalcification is identified.  BI-RADS 1: Negative    2024, Bilateral Diagnostic MMG with [facility] & Right Limited Breast US ([facility]):  MMG:  There are  scattered areas of fibroglandular density. There is stability of the fibroglandular pattern when compared to the prior examination. There are no new masses, areas of architectural distortion, or  suspicious microcalcification. There is no mammographic abnormality identified within the retro areolar region of the right breast to explain patient's nipple discharge.  US:  At the 8 o'clock position 3 cm from the nipple there is a dilated debris-filled duct which extends to  the nipple. There is no evidence of internal mass or abnormal vascular flow within the duct.  BI-RADS 2: Benign      5/7/24, Bilateral Breast MRI ([facility]):  RIGHT BREAST:    There is intrinsic T1 hyperintensity within a few ducts in the anterior right breast, consistent with proteinaceous and/or hemorrhagic contents. At 5-6 o'clock in the anterior subareolar right breast, 0.3 cm from the base of the nipple and abutting the overlying skin anteriorly, there is a 0.3 cm enhancing focus, which is probably benign. Otherwise, no suspicious enhancing mass or area of non-mass enhancement is identified.  The visualized axilla is within normal limits.  LEFT BREAST:    No suspicious enhancing mass or area of non-mass enhancement is identified.  The visualized axilla is within normal limits.   EXTRAMAMMARY FINDINGS:  There are no pathologically enlarged internal mammary chain lymph nodes on either side.    There is trace bilateral pleural fluid.  BI-RADS Category 3: Probably benign      11/14/2024 bilateral breast MRI at [facility]  FINDINGS: There is scattered fibroglandular tissue. There is mild  background parenchymal enhancement.  RIGHT BREAST:    At 5-6 o'clock in the anterior subareolar right breast, there is  redemonstration of a 0.3 cm enhancing focus, which is superficial. This  is similar to 5/7/2024. This area is below threshold for detection on  kinetic analysis. This remains probably benign.  No new suspicious enhancing mass or area of non-mass enhancement  is  identified.  The visualized axilla is within normal limits.  LEFT BREAST:    No suspicious enhancing mass or area of non-mass enhancement is  identified.  The visualized axilla is within normal limits.  EXTRAMAMMARY FINDINGS:  There are no pathologically enlarged internal mammary chain lymph nodes  on either side.    IMPRESSION AND RECOMMENDATION:  1.  A probably benign 0.3 cm enhancing focus in the subareolar right  breast is not significantly changed from 5/7/2024. Recommend follow-up  contrast-enhanced breast MRI in 6 to 12 months to document continued  stability. Further management of the patient's right breast symptoms  should otherwise be based on clinical assessment.  2.  No MRI evidence of malignancy in the left breast.  BI-RADS Category 3: Probably benign       Review of Systems   Constitutional:  Positive for appetite change, chills, diaphoresis, fatigue, fever and unexpected weight change (loss 30lbs-unsure reason).   HENT:   Positive for hearing loss, lump/mass, mouth sores, nosebleeds, tinnitus, trouble swallowing and voice change. Negative for sore throat.    Eyes:  Positive for eye problems (blurry vision). Negative for icterus.   Respiratory:  Positive for chest tightness, shortness of breath and wheezing (laying flat and with exertion). Negative for cough and hemoptysis.    Cardiovascular:  Positive for chest pain, leg swelling and palpitations.   Gastrointestinal:  Positive for abdominal distention, abdominal pain, blood in stool, constipation, diarrhea and nausea. Negative for rectal pain and vomiting.   Endocrine: Negative for hot flashes.   Genitourinary:  Negative for bladder incontinence, difficulty urinating, dyspareunia, dysuria, frequency, hematuria, menstrual problem, nocturia, pelvic pain, vaginal bleeding and vaginal discharge.    Musculoskeletal:  Positive for arthralgias, back pain, flank pain, gait problem and myalgias. Negative for neck pain and neck stiffness.   Skin:  Negative  for itching, rash and wound.   Neurological:  Positive for dizziness, extremity weakness, gait problem, light-headedness (Recent recurrent migraines) and speech difficulty. Negative for headaches, numbness and seizures.   Hematological:  Negative for adenopathy. Bruises/bleeds easily.   Psychiatric/Behavioral:  Positive for confusion, decreased concentration (PTSD), depression and sleep disturbance. Negative for suicidal ideas. The patient is not nervous/anxious.        Medications:    Current Outpatient Medications:     Aspirin-Acetaminophen-Caffeine (EXCEDRIN PO), Take  by mouth., Disp: , Rfl:     cetirizine (ZyrTEC Allergy) 10 MG tablet, Take 1 tablet by mouth Daily., Disp: , Rfl:     clonazePAM (KlonoPIN) 1 MG tablet, Take 1 tablet by mouth 3 (Three) Times a Day., Disp: , Rfl:     Ibuprofen 200 MG capsule, Take 1 capsule by mouth As Needed., Disp: , Rfl:     Iron-Vitamins (GERITOL COMPLETE PO), Take  by mouth., Disp: , Rfl:     levothyroxine (SYNTHROID, LEVOTHROID) 50 MCG tablet, TAKE 1 TABLET BY MOUTH EVERY MORNING., Disp: 90 tablet, Rfl: 0    lisdexamfetamine (VYVANSE) 60 MG capsule, Take 1 capsule by mouth Every Morning, Disp: , Rfl:     pantoprazole (PROTONIX) 40 MG EC tablet, Take 1 tablet by mouth Daily., Disp: , Rfl:     Probiotic Product (PROBIOTIC DAILY PO), Take  by mouth., Disp: , Rfl:   No current facility-administered medications for this visit.      Allergies   Allergen Reactions    Cinnamon Anaphylaxis    Latex Anaphylaxis    Penicillins Anaphylaxis    Vancocin Hcl [Vancomycin] Swelling    Other Nausea And Vomiting     GENERAL ANESTHESIA     Zinc Oxide Angioedema    Bee Venom Unknown - Low Severity       Past Medical History:   Diagnosis Date    Anxiety     Depression     Generalized anxiety disorder     GI problem     Hypotension     Hypothyroidism     Mitral valve prolapse     PTSD (post-traumatic stress disorder)     Supraventricular tachycardia     Syncope     Systemic lupus erythematosus      Tricuspid valve regurgitation        Past Surgical History:   Procedure Laterality Date     SECTION      CHOLECYSTECTOMY      DILATION AND CURETTAGE, DIAGNOSTIC / THERAPEUTIC      ENDOSCOPY      x4    HYSTERECTOMY  2007    still has right ovary     KNEE ARTHROSCOPY Left     MOUTH SURGERY      THYROIDECTOMY      partial       Family History   Problem Relation Age of Onset    Aortic aneurysm Mother     Cancer Mother         Discovered at stage IV    Anxiety disorder Mother     Depression Mother        Social History     Socioeconomic History    Marital status:     Number of children: 2   Tobacco Use    Smoking status: Every Day     Average packs/day: 1 pack/day for 17.0 years (17.0 ttl pk-yrs)     Types: Cigarettes     Start date:      Passive exposure: Past    Smokeless tobacco: Never   Vaping Use    Vaping status: Never Used   Substance and Sexual Activity    Alcohol use: Yes     Comment: OCC-2 every year    Drug use: Never    Sexual activity: Yes     Patient drinks 1 servings of caffeine per day.       GYNECOLOGIC HISTORY:   G: 3. P: 2. AB: 1.  Last menstrual period: [id number], sp SHANTELL/USO   Age at menarche: 10  Age at first childbirth: 22  Lactation/How lon weeks  Age at menopause: unknown  Total years of oral contraceptive use: unsure  Total years of hormone replacement therapy: 0      Objective   PHYSICAL EXAMINATION  There were no vitals filed for this visit.    ECOG 0 - Asymptomatic  General: NAD, well appearing  Psych: a&o x 3, normal mood and affect  Eyes: EOMI, no scleral icterus  ENMT: neck supple without masses or thyromegaly, mucus membranes moist  Resp: normal effort, CTAB  CV: RRR, no murmurs, no edema  GI: soft, NT, ND  MSK: normal gait, normal ROM in bilateral shoulders  Lymph nodes: no cervical, supraclavicular or axillary lymphadenopathy  Breast: symmetric, moderate size, grade 3 ptosis  Right: No visible abnormalities on inspection while seated, with arms raised or hands  on hips. No masses, skin changes, or nipple abnormalities.  No discharge with firm palpation.  Left: No visible abnormalities on inspection while seated, with arms raised or hands on hips. No masses, skin changes, or nipple abnormalities.  No discharge with firm palpation.        Assessment & Plan   Assessment:   1. 48 y.o. F with a right nipple discharge, which appears to be physiologic.  2.  She has a probably benign 3 mm enhancing focus just deep to the right areola on MRI.  3.  She has right breast pain, likely related to not wearing a bra and smoking.    Discussion:  We discussed pathologic versus physiologic nipple discharge.  The discharge she describes mainly occurs after showers and when she tries to elicit it.  I could not elicit any discharge on physical exam today.  I have asked her to stop stimulating her nipple as this will often encourage physiologic discharge to persist.  I explained that many women can have dilated ducts with internal debris on imaging even without having discharge and that this in and of itself is not a concerning finding. The 3 mm mass seen on MRI is probably incidental and unrelated to the nipple discharge.  We discussed the process of imaging surveillance.    We also discussed her right breast pain and how it is probably unrelated to the nipple discharge.  She is going to try wearing a sports bra day and night.  She also understands that her smoking is probably contributing.    Plan:  -mammo and exam in April  - forlest bra and vit E 400iu daily  -breast mri in April   KELSI Morris    I spent 20 minutes caring for Gay on this date of service. This time includes time spent by me in the following activities: preparing for the visit, performing a medically appropriate examination and/or evaluation , counseling and educating the patient/family/caregiver, ordering medications, tests, or procedures, documenting information in the medical record, and independently  interpreting results and communicating that information with the patient/family/caregiver.      CC:  Gail Carlson, APRN

## 2024-11-18 ENCOUNTER — OFFICE VISIT (OUTPATIENT)
Dept: SURGERY | Facility: CLINIC | Age: 49
End: 2024-11-18
Payer: COMMERCIAL

## 2024-11-18 VITALS
HEART RATE: 105 BPM | DIASTOLIC BLOOD PRESSURE: 78 MMHG | HEIGHT: 68 IN | WEIGHT: 159 LBS | BODY MASS INDEX: 24.1 KG/M2 | OXYGEN SATURATION: 99 % | SYSTOLIC BLOOD PRESSURE: 118 MMHG

## 2024-11-18 DIAGNOSIS — R92.8 ABNORMAL MAMMOGRAM: Primary | ICD-10-CM

## 2024-11-18 DIAGNOSIS — Z12.31 ENCOUNTER FOR SCREENING MAMMOGRAM FOR MALIGNANT NEOPLASM OF BREAST: ICD-10-CM

## 2024-11-18 PROCEDURE — 1160F RVW MEDS BY RX/DR IN RCRD: CPT | Performed by: NURSE PRACTITIONER

## 2024-11-18 PROCEDURE — 1159F MED LIST DOCD IN RCRD: CPT | Performed by: NURSE PRACTITIONER

## 2024-11-18 PROCEDURE — 99213 OFFICE O/P EST LOW 20 MIN: CPT | Performed by: NURSE PRACTITIONER

## 2024-11-18 RX ORDER — CALCIUM CARBONATE 300MG(750)
400 TABLET,CHEWABLE ORAL DAILY
COMMUNITY
Start: 2024-09-26

## 2024-11-18 RX ORDER — POLYETHYLENE GLYCOL 3350 17 G/17G
POWDER, FOR SOLUTION ORAL
COMMUNITY
Start: 2024-08-27

## 2024-11-18 RX ORDER — HYDROXYCHLOROQUINE SULFATE 200 MG/1
TABLET, FILM COATED ORAL
COMMUNITY
Start: 2024-07-17

## 2024-11-18 RX ORDER — ALBUTEROL SULFATE 90 UG/1
2 INHALANT RESPIRATORY (INHALATION)
COMMUNITY
Start: 2024-10-29

## 2024-12-09 DIAGNOSIS — G25.5: ICD-10-CM

## 2024-12-09 DIAGNOSIS — Z79.4 ENCOUNTER FOR LONG-TERM (CURRENT) USE OF INSULIN: Primary | ICD-10-CM

## 2024-12-09 DIAGNOSIS — M32.19: ICD-10-CM

## 2024-12-09 DIAGNOSIS — E55.9 VITAMIN D DEFICIENCY: ICD-10-CM

## 2025-01-23 ENCOUNTER — TELEPHONE (OUTPATIENT)
Dept: CARDIOLOGY | Facility: CLINIC | Age: 50
End: 2025-01-23
Payer: COMMERCIAL

## 2025-01-23 NOTE — TELEPHONE ENCOUNTER
"  Caller: Rossi Osullivan \"Gay\"    Relationship: Self    Best call back number: [phone]    What is the best time to reach you: ANY    Who are you requesting to speak with (clinical staff, provider,  specific staff member): ANY    What was the call regarding: CALLING TO LET DR. DWYER KNOW THAT SHE SAW  NEURO ON 1-13-25 THEY BRANDON BLOOD THAT DAY.  SHE IS SCHEDULED FOR AN EMG ON 5-14-25  THEY WANT TO DO AN AUTONOMIC TILT TABLE TEST ON HER BUT IT IS ONLY AVAILABLE IN [place] OR [place]     DR. MICA LAM MD    (DR. MILLER)  [facility] NEUR[facility]   [address], [place]    SHE IS PROVIDING THIS INFO SO THAT DR. DWYER COULD GET THOSE RECORDS.    ALSO HAD XRAYS AT [facility] 1-22-25 THORACIC AND LUMBAR          "

## 2025-01-29 ENCOUNTER — TRANSCRIBE ORDERS (OUTPATIENT)
Dept: ADMINISTRATIVE | Facility: HOSPITAL | Age: 50
End: 2025-01-29
Payer: COMMERCIAL

## 2025-01-29 DIAGNOSIS — G90.9 AUTONOMIC DISORDER: Primary | ICD-10-CM

## 2025-02-12 ENCOUNTER — HOSPITAL ENCOUNTER (OUTPATIENT)
Facility: HOSPITAL | Age: 50
Setting detail: HOSPITAL OUTPATIENT SURGERY
Discharge: HOME | End: 2025-02-12
Attending: INTERNAL MEDICINE | Admitting: INTERNAL MEDICINE
Payer: COMMERCIAL

## 2025-02-12 ENCOUNTER — ANESTHESIA EVENT (OUTPATIENT)
Dept: GASTROENTEROLOGY | Facility: HOSPITAL | Age: 50
End: 2025-02-12
Payer: COMMERCIAL

## 2025-02-12 ENCOUNTER — ON CAMPUS - OUTPATIENT (OUTPATIENT)
Dept: URBAN - METROPOLITAN AREA HOSPITAL 114 | Facility: HOSPITAL | Age: 50
End: 2025-02-12
Payer: COMMERCIAL

## 2025-02-12 ENCOUNTER — ANESTHESIA (OUTPATIENT)
Dept: GASTROENTEROLOGY | Facility: HOSPITAL | Age: 50
End: 2025-02-12
Payer: COMMERCIAL

## 2025-02-12 VITALS
WEIGHT: 150.2 LBS | OXYGEN SATURATION: 100 % | SYSTOLIC BLOOD PRESSURE: 103 MMHG | BODY MASS INDEX: 22.76 KG/M2 | DIASTOLIC BLOOD PRESSURE: 49 MMHG | HEIGHT: 68 IN | RESPIRATION RATE: 20 BRPM | HEART RATE: 66 BPM

## 2025-02-12 DIAGNOSIS — R13.10 DYSPHAGIA: ICD-10-CM

## 2025-02-12 DIAGNOSIS — R10.13 EPIGASTRIC PAIN: ICD-10-CM

## 2025-02-12 DIAGNOSIS — K31.7 POLYP OF STOMACH AND DUODENUM: ICD-10-CM

## 2025-02-12 DIAGNOSIS — K22.2 ESOPHAGEAL OBSTRUCTION: ICD-10-CM

## 2025-02-12 DIAGNOSIS — K29.50 UNSPECIFIED CHRONIC GASTRITIS WITHOUT BLEEDING: ICD-10-CM

## 2025-02-12 DIAGNOSIS — R13.10 DYSPHAGIA, UNSPECIFIED: ICD-10-CM

## 2025-02-12 DIAGNOSIS — K21.00 GASTRO-ESOPHAGEAL REFLUX DISEASE WITH ESOPHAGITIS, WITHOUT B: ICD-10-CM

## 2025-02-12 PROCEDURE — 43239 EGD BIOPSY SINGLE/MULTIPLE: CPT | Performed by: INTERNAL MEDICINE

## 2025-02-12 PROCEDURE — 25010000002 LIDOCAINE 2% SOLUTION: Performed by: NURSE ANESTHETIST, CERTIFIED REGISTERED

## 2025-02-12 PROCEDURE — 88305 TISSUE EXAM BY PATHOLOGIST: CPT | Performed by: INTERNAL MEDICINE

## 2025-02-12 PROCEDURE — 25810000003 LACTATED RINGERS PER 1000 ML: Performed by: INTERNAL MEDICINE

## 2025-02-12 PROCEDURE — 43450 DILATE ESOPHAGUS 1/MULT PASS: CPT | Performed by: INTERNAL MEDICINE

## 2025-02-12 PROCEDURE — 88342 IMHCHEM/IMCYTCHM 1ST ANTB: CPT | Performed by: INTERNAL MEDICINE

## 2025-02-12 PROCEDURE — 87071 CULTURE AEROBIC QUANT OTHER: CPT | Performed by: INTERNAL MEDICINE

## 2025-02-12 PROCEDURE — 25010000002 PROPOFOL 1000 MG/100ML EMULSION: Performed by: NURSE ANESTHETIST, CERTIFIED REGISTERED

## 2025-02-12 RX ORDER — SODIUM CHLORIDE, SODIUM LACTATE, POTASSIUM CHLORIDE, CALCIUM CHLORIDE 600; 310; 30; 20 MG/100ML; MG/100ML; MG/100ML; MG/100ML
20 INJECTION, SOLUTION INTRAVENOUS CONTINUOUS
Status: DISCONTINUED | OUTPATIENT
Start: 2025-02-12 | End: 2025-02-12 | Stop reason: HOSPADM

## 2025-02-12 RX ORDER — LIDOCAINE HYDROCHLORIDE 20 MG/ML
INJECTION, SOLUTION INFILTRATION; PERINEURAL AS NEEDED
Status: DISCONTINUED | OUTPATIENT
Start: 2025-02-12 | End: 2025-02-12 | Stop reason: SURG

## 2025-02-12 RX ORDER — PROPOFOL 10 MG/ML
INJECTION, EMULSION INTRAVENOUS AS NEEDED
Status: DISCONTINUED | OUTPATIENT
Start: 2025-02-12 | End: 2025-02-12 | Stop reason: SURG

## 2025-02-12 RX ADMIN — PROPOFOL INJECTABLE EMULSION 70 MG: 10 INJECTION, EMULSION INTRAVENOUS at 09:26

## 2025-02-12 RX ADMIN — SODIUM CHLORIDE, POTASSIUM CHLORIDE, SODIUM LACTATE AND CALCIUM CHLORIDE 20 ML/HR: 600; 310; 30; 20 INJECTION, SOLUTION INTRAVENOUS at 09:16

## 2025-02-12 RX ADMIN — LIDOCAINE HYDROCHLORIDE 60 MG: 20 INJECTION, SOLUTION INFILTRATION; PERINEURAL at 09:27

## 2025-02-12 RX ADMIN — PROPOFOL INJECTABLE EMULSION 160 MCG/KG/MIN: 10 INJECTION, EMULSION INTRAVENOUS at 09:27

## 2025-02-12 NOTE — DISCHARGE INSTRUCTIONS
For the next 24 hours patient needs to be with a responsible adult.    For 24 hours DO NOT drive, operate machinery, appliances, drink alcohol, make important decisions or sign legal documents.    Start with a light or bland diet if you are feeling sick to your stomach otherwise advance to regular diet as tolerated.    Follow recommendations on procedure report if provided by your doctor.    Call Dr Tinsley for problems .    Problems may include but not limited to: large amounts of bleeding, trouble breathing, repeated vomiting, severe unrelieved pain, fever or chills.

## 2025-02-12 NOTE — H&P
[facility]   HISTORY AND PHYSICAL    Patient Name: Rossi Osullivan  : 1975  MRN: [id number]  Primary Care Physician:  Gail Carlson APRN  Date of admission: 2025    Subjective   Subjective     Chief Complaint:dysphagia, diarrhea,   dyspepsia    History of Present Illness  Dysphagia for solids, diarrhea   Review of Systems   HENT:  Positive for trouble swallowing.    Gastrointestinal:  Positive for diarrhea.   All other systems reviewed and are negative.       Personal History     Past Medical History:   Diagnosis Date    Anxiety     Autonomic dysfunction     per patient    Depression     Generalized anxiety disorder     GI problem     Hypotension     Hypothyroidism     Mitral valve prolapse     PONV (postoperative nausea and vomiting)     PTSD (post-traumatic stress disorder)     Supraventricular tachycardia     Syncope     Systemic lupus erythematosus     Tricuspid valve regurgitation        Past Surgical History:   Procedure Laterality Date     SECTION      CHOLECYSTECTOMY      DILATION AND CURETTAGE, DIAGNOSTIC / THERAPEUTIC      ENDOSCOPY      x4    HYSTERECTOMY  2007    still has right ovary     KNEE ARTHROSCOPY Left     MOUTH SURGERY      THYROIDECTOMY      partial       Family History: family history includes Anxiety disorder in her mother; Aortic aneurysm in her mother; Cancer in her mother; Depression in her mother. Otherwise pertinent FHx was reviewed and not pertinent to current issue.    Social History:  reports that she has been smoking cigarettes. She started smoking about 22 years ago. She has a 17 pack-year smoking history. She has been exposed to tobacco smoke. She has never used smokeless tobacco. She reports current alcohol use. She reports that she does not use drugs.    Home Medications:  Aspirin-Acetaminophen-Caffeine, Diclofenac Sodium, Ibuprofen, Iron-Vitamins, Magnesium, Probiotic Product, albuterol sulfate HFA, cetirizine, clonazePAM,  hydroxychloroquine, levothyroxine, lisdexamfetamine, pantoprazole, and polyethylene glycol    Allergies:  Allergies   Allergen Reactions    Cinnamon Anaphylaxis    Latex Anaphylaxis    Penicillins Anaphylaxis     Beta lactam allergy details  Antibiotic reaction: unknown  Age at reaction: unknown  Dose to reaction time: unknown  Reason for antibiotic: unknown  Epinephrine required for reaction?: unknown  Tolerated antibiotics: unknown        Vancocin Hcl [Vancomycin] Swelling    Other Nausea And Vomiting     GENERAL ANESTHESIA     Zinc Oxide Angioedema    Bee Venom Unknown - Low Severity       Objective    Objective     Vitals:   Heart Rate:  [74] 74  Resp:  [18] 18  BP: (97)/(40) 97/40    Physical Exam  HENT:      Right Ear: External ear normal.      Left Ear: External ear normal.      Mouth/Throat:      Pharynx: Oropharynx is clear.   Eyes:      Conjunctiva/sclera: Conjunctivae normal.   Cardiovascular:      Rate and Rhythm: Normal rate.   Pulmonary:      Effort: Pulmonary effort is normal.   Abdominal:      General: Abdomen is flat.   Skin:     General: Skin is warm and dry.   Neurological:      General: No focal deficit present.      Mental Status: She is alert.   Psychiatric:         Mood and Affect: Mood normal.         Result Review    Result Review:  I have personally reviewed the results from the time of this admission to 2/12/2025 09:25 EST and agree with these findings:  []  Laboratory list / accordion  []  Microbiology  []  Radiology  []  EKG/Telemetry   []  Cardiology/Vascular   []  Pathology  []  Old records  []  Other:  Most notable findings include:       Assessment & Plan   Assessment / Plan     Brief Patient Summary:  Rossi Osullivan is a 49 y.o. female who   Dysphagia  Dyspepsia  Diarrhea      Active Hospital Problems:  There are no active hospital problems to display for this patient.    Plan: Upper tract endoscopy, risks, alternatives and benefits discussed with patient and patient is  agreeable to proceed      VTE Prophylaxis:  No VTE prophylaxis order currently exists.        CODE STATUS:       Admission Status:  I believe this patient meets outpatient  status.    Kris Tinsley MD

## 2025-02-12 NOTE — ANESTHESIA PREPROCEDURE EVALUATION
Anesthesia Evaluation     Patient summary reviewed and Nursing notes reviewed                Airway   Mallampati: III  Possible difficult intubation  Dental      Pulmonary    (+) a smoker Current, COPD,  Cardiovascular     ECG reviewed  Rhythm: regular  Rate: normal    (+) dysrhythmias Tachycardia      Neuro/Psych  (+) syncope, psychiatric history Anxiety and Depression  GI/Hepatic/Renal/Endo    (+) obesity, thyroid problem hypothyroidism    Musculoskeletal (-) negative ROS    Abdominal    Substance History   (+) alcohol use     OB/GYN negative ob/gyn ROS         Other   autoimmune disease lupus,                   Anesthesia Plan    ASA 3     MAC     intravenous induction     Anesthetic plan, risks, benefits, and alternatives have been provided, discussed and informed consent has been obtained with: patient.    CODE STATUS:

## 2025-02-12 NOTE — ANESTHESIA POSTPROCEDURE EVALUATION
Patient: Rossi Osullivan    Procedure Summary       Date: 02/12/25 Room / Location:  [facility] /  [facility]    Anesthesia Start: 0920 Anesthesia Stop: 0942    Procedure: ESOPHAGOGASTRODUODENOSCOPY with biopsies and #56 Gamino esophageal dilation (Esophagus) Diagnosis:     Surgeons: Kris Tinsley MD Provider: Cameron Parker MD    Anesthesia Type: MAC ASA Status: 3            Anesthesia Type: MAC    Vitals  Vitals Value Taken Time   BP 93/51 02/12/25 1004   Temp     Pulse 67 02/12/25 1006   Resp 20 02/12/25 1004   SpO2 100 % 02/12/25 1006   Vitals shown include unfiled device data.        Post Anesthesia Care and Evaluation    Patient location during evaluation: PACU  Patient participation: complete - patient participated  Level of consciousness: awake and alert  Pain management: adequate    Airway patency: patent  Anesthetic complications: No anesthetic complications    Cardiovascular status: acceptable  Respiratory status: acceptable  Hydration status: acceptable    Comments: --------------------            02/12/25               1004     --------------------   BP:       93/51      Pulse:      71       Resp:       20       SpO2:      100%     --------------------

## 2025-02-14 LAB
BACTERIA SPEC AEROBE CULT: ABNORMAL
CYTO UR: NORMAL
LAB AP CASE REPORT: NORMAL
LAB AP CLINICAL INFORMATION: NORMAL
LAB AP INTRADEPARTMENTAL CONSULT: NORMAL
LAB AP SPECIAL STAINS: NORMAL
PATH REPORT.FINAL DX SPEC: NORMAL
PATH REPORT.GROSS SPEC: NORMAL

## 2025-02-17 ENCOUNTER — LAB (OUTPATIENT)
Dept: LAB | Facility: HOSPITAL | Age: 50
End: 2025-02-17
Payer: COMMERCIAL

## 2025-02-17 ENCOUNTER — TRANSCRIBE ORDERS (OUTPATIENT)
Dept: LAB | Facility: HOSPITAL | Age: 50
End: 2025-02-17
Payer: COMMERCIAL

## 2025-02-17 DIAGNOSIS — E03.9 ACQUIRED HYPOTHYROIDISM: ICD-10-CM

## 2025-02-17 DIAGNOSIS — T14.8XXA CRUSHING INJURY OF MULTIPLE SITES OF TRUNK: Primary | ICD-10-CM

## 2025-02-17 DIAGNOSIS — T14.8XXA CRUSHING INJURY OF MULTIPLE SITES OF TRUNK: ICD-10-CM

## 2025-02-17 DIAGNOSIS — Z13.220 SCREENING FOR LIPOID DISORDERS: ICD-10-CM

## 2025-02-17 DIAGNOSIS — Z13.6 SCREENING FOR ISCHEMIC HEART DISEASE: ICD-10-CM

## 2025-02-17 DIAGNOSIS — G25.5: ICD-10-CM

## 2025-02-17 DIAGNOSIS — E55.9 VITAMIN D DEFICIENCY: ICD-10-CM

## 2025-02-17 DIAGNOSIS — Z79.4 ENCOUNTER FOR LONG-TERM (CURRENT) USE OF INSULIN: ICD-10-CM

## 2025-02-17 DIAGNOSIS — M32.19: ICD-10-CM

## 2025-02-17 LAB
25(OH)D3 SERPL-MCNC: 29.9 NG/ML (ref 30–100)
BASOPHILS # BLD AUTO: 0.03 10*3/MM3 (ref 0–0.2)
BASOPHILS NFR BLD AUTO: 0.6 % (ref 0–1.5)
C3 SERPL-MCNC: 102 MG/DL (ref 82–167)
C4 SERPL-MCNC: 12 MG/DL (ref 14–44)
DEPRECATED RDW RBC AUTO: 42.2 FL (ref 37–54)
EOSINOPHIL # BLD AUTO: 0.02 10*3/MM3 (ref 0–0.4)
EOSINOPHIL NFR BLD AUTO: 0.4 % (ref 0.3–6.2)
ERYTHROCYTE [DISTWIDTH] IN BLOOD BY AUTOMATED COUNT: 12.6 % (ref 12.3–15.4)
HCT VFR BLD AUTO: 38.1 % (ref 34–46.6)
HGB BLD-MCNC: 13.3 G/DL (ref 12–15.9)
IMM GRANULOCYTES # BLD AUTO: 0 10*3/MM3 (ref 0–0.05)
IMM GRANULOCYTES NFR BLD AUTO: 0 % (ref 0–0.5)
LYMPHOCYTES # BLD AUTO: 1.67 10*3/MM3 (ref 0.7–3.1)
LYMPHOCYTES NFR BLD AUTO: 35.7 % (ref 19.6–45.3)
MCH RBC QN AUTO: 31.5 PG (ref 26.6–33)
MCHC RBC AUTO-ENTMCNC: 34.9 G/DL (ref 31.5–35.7)
MCV RBC AUTO: 90.3 FL (ref 79–97)
MONOCYTES # BLD AUTO: 0.25 10*3/MM3 (ref 0.1–0.9)
MONOCYTES NFR BLD AUTO: 5.3 % (ref 5–12)
NEUTROPHILS NFR BLD AUTO: 2.71 10*3/MM3 (ref 1.7–7)
NEUTROPHILS NFR BLD AUTO: 58 % (ref 42.7–76)
PLATELET # BLD AUTO: 254 10*3/MM3 (ref 140–450)
PMV BLD AUTO: 9.3 FL (ref 6–12)
PROT ?TM UR-MCNC: 4.4 MG/DL
RBC # BLD AUTO: 4.22 10*6/MM3 (ref 3.77–5.28)
WBC NRBC COR # BLD AUTO: 4.68 10*3/MM3 (ref 3.4–10.8)

## 2025-02-17 PROCEDURE — 84443 ASSAY THYROID STIM HORMONE: CPT

## 2025-02-17 PROCEDURE — 80053 COMPREHEN METABOLIC PANEL: CPT

## 2025-02-17 PROCEDURE — 85025 COMPLETE CBC W/AUTO DIFF WBC: CPT

## 2025-02-17 PROCEDURE — 86140 C-REACTIVE PROTEIN: CPT

## 2025-02-17 PROCEDURE — 86225 DNA ANTIBODY NATIVE: CPT | Performed by: INTERNAL MEDICINE

## 2025-02-17 PROCEDURE — 82306 VITAMIN D 25 HYDROXY: CPT

## 2025-02-17 PROCEDURE — 84156 ASSAY OF PROTEIN URINE: CPT

## 2025-02-17 PROCEDURE — 86160 COMPLEMENT ANTIGEN: CPT | Performed by: INTERNAL MEDICINE

## 2025-02-17 PROCEDURE — 80061 LIPID PANEL: CPT

## 2025-02-18 LAB
ALBUMIN SERPL-MCNC: 3.9 G/DL (ref 3.5–5.2)
ALBUMIN/GLOB SERPL: 1.1 G/DL
ALP SERPL-CCNC: 110 U/L (ref 39–117)
ALT SERPL W P-5'-P-CCNC: 20 U/L (ref 1–33)
ANION GAP SERPL CALCULATED.3IONS-SCNC: 11.3 MMOL/L (ref 5–15)
AST SERPL-CCNC: 21 U/L (ref 1–32)
BILIRUB SERPL-MCNC: 0.3 MG/DL (ref 0–1.2)
BUN SERPL-MCNC: 10 MG/DL (ref 6–20)
BUN/CREAT SERPL: 13.7 (ref 7–25)
CALCIUM SPEC-SCNC: 9.4 MG/DL (ref 8.6–10.5)
CHLORIDE SERPL-SCNC: 103 MMOL/L (ref 98–107)
CHOLEST SERPL-MCNC: 128 MG/DL (ref 0–200)
CO2 SERPL-SCNC: 23.7 MMOL/L (ref 22–29)
CREAT SERPL-MCNC: 0.73 MG/DL (ref 0.57–1)
CRP SERPL-MCNC: <0.3 MG/DL (ref 0–0.5)
CYTO UR: NORMAL
EGFRCR SERPLBLD CKD-EPI 2021: 101.6 ML/MIN/1.73
GLOBULIN UR ELPH-MCNC: 3.6 GM/DL
GLUCOSE SERPL-MCNC: 92 MG/DL (ref 65–99)
HDLC SERPL-MCNC: 37 MG/DL (ref 40–60)
LAB AP CASE REPORT: NORMAL
LAB AP CLINICAL INFORMATION: NORMAL
LAB AP INTRADEPARTMENTAL CONSULT: NORMAL
LAB AP SPECIAL STAINS: NORMAL
LDLC SERPL CALC-MCNC: 80 MG/DL (ref 0–100)
LDLC/HDLC SERPL: 2.18 {RATIO}
PATH REPORT.ADDENDUM SPEC: NORMAL
PATH REPORT.FINAL DX SPEC: NORMAL
PATH REPORT.GROSS SPEC: NORMAL
POTASSIUM SERPL-SCNC: 3.9 MMOL/L (ref 3.5–5.2)
PROT SERPL-MCNC: 7.5 G/DL (ref 6–8.5)
SODIUM SERPL-SCNC: 138 MMOL/L (ref 136–145)
TRIGL SERPL-MCNC: 51 MG/DL (ref 0–150)
TSH SERPL DL<=0.05 MIU/L-ACNC: 1.27 UIU/ML (ref 0.27–4.2)
VLDLC SERPL-MCNC: 11 MG/DL (ref 5–40)

## 2025-02-19 LAB — DSDNA AB SER-ACNC: 12 IU/ML (ref 0–9)

## 2025-04-14 ENCOUNTER — HOSPITAL ENCOUNTER (OUTPATIENT)
Dept: MRI IMAGING | Facility: HOSPITAL | Age: 50
Discharge: HOME | End: 2025-04-14
Payer: COMMERCIAL

## 2025-04-14 ENCOUNTER — HOSPITAL ENCOUNTER (OUTPATIENT)
Dept: MAMMOGRAPHY | Facility: HOSPITAL | Age: 50
Discharge: HOME | End: 2025-04-14
Payer: COMMERCIAL

## 2025-04-14 DIAGNOSIS — Z12.31 ENCOUNTER FOR SCREENING MAMMOGRAM FOR MALIGNANT NEOPLASM OF BREAST: ICD-10-CM

## 2025-04-14 DIAGNOSIS — R92.8 ABNORMAL MAMMOGRAM: ICD-10-CM

## 2025-04-14 PROCEDURE — A9577 INJ MULTIHANCE: HCPCS | Performed by: NURSE PRACTITIONER

## 2025-04-14 PROCEDURE — 77049 MRI BREAST C-+ W/CAD BI: CPT

## 2025-04-14 PROCEDURE — 25510000002 GADOBENATE DIMEGLUMINE 529 MG/ML SOLUTION: Performed by: NURSE PRACTITIONER

## 2025-04-14 PROCEDURE — 77063 BREAST TOMOSYNTHESIS BI: CPT

## 2025-04-14 PROCEDURE — 77067 SCR MAMMO BI INCL CAD: CPT

## 2025-04-14 RX ADMIN — GADOBENATE DIMEGLUMINE 14 ML: 529 INJECTION, SOLUTION INTRAVENOUS at 15:08

## 2025-04-18 NOTE — PROGRESS NOTES
"[facility]     Referring Provider: KELSI Marlow     Chief complaint:  Right nipple discharge      Subjective   5/13/2024 Saw Dr. Aquino  HPI: Ms. Rossi Osullivan is a 47 yo woman, seen at the request of KELSI Marlow, for evaluation of right nipple discharge.  She has had right nipple discharge on and off for the last 6 months.  This mainly happens after a shower or when she squeezes her breast with fairly significant pressure.  She says she is also seen it in her bra occasionally.  She also has been having right breast pain for the past 6 months.  She admits she has not been wearing a bra regularly since she has been working from home.  Right ultrasound showed a dilated duct with some debris.  MRI showed a likely incidental enhancing focus abutting the areolar skin. Her work-up is detailed in the breast history section below.     Past medical history is significant for multiple comorbidities including lupus, hypothyroidism, anxiety/depression, migraines. She denies any prior history of abnormal mammograms or breast biopsies. She currently smokes 1 pack/day. She denies any family history of breast or ovarian cancer. She was joined today in clinic by her boyfriend.     11/18/2024 Saw Dr. Aquino  Patient presenting to the office today for routine follow-up.  On 11/14/2024 she had a bilateral breast MRI that resulted as BI-RADS 3 for a probably benign 0.3 cm enhancing focus in the subareolar right breast that has not changed.  They recommended a follow-up breast MRI in 6 to 12 months.    4/21/2025 interval history  Patient presents today for routine follow-up and exam.  Patient completed screening mammogram on 4/14/2025 resulting in BI-RADS 1.  She also completed breast MRI on 4/14/2025 resulting in BI-RADS 2 taking her out of surveillance. I asked her if she was still having nipple discharge and she replied \"I don't know\". Breat pain is stable.  She is experiencing " a lot of other health concerns and seeing rheumatology and states that most of her other health issues have far exceeded her issues with her breast.  Her and her  do not want a follow-up appointment in this office unless it is necessary.        I personally reviewed her records and summarized her relevant breast history/imagin2023, Screening MMG with [facility] ( [facility]):  Scattered areas of fibroglandular density. No suspicious mass, area of architectural distortion or suspicious microcalcification is identified.  BI-RADS 1: Negative    2024, Bilateral Diagnostic MMG with [facility] & Right Limited Breast US ( [facility]):  MMG:  There are scattered areas of fibroglandular density. There is stability of the fibroglandular pattern when compared to the prior examination. There are no new masses, areas of architectural distortion, or  suspicious microcalcification. There is no mammographic abnormality identified within the retro areolar region of the right breast to explain patient's nipple discharge.  US:  At the 8 o'clock position 3 cm from the nipple there is a dilated debris-filled duct which extends to  the nipple. There is no evidence of internal mass or abnormal vascular flow within the duct.  BI-RADS 2: Benign      24, Bilateral Breast MRI ( [facility]):  RIGHT BREAST:    There is intrinsic T1 hyperintensity within a few ducts in the anterior right breast, consistent with proteinaceous and/or hemorrhagic contents. At 5-6 o'clock in the anterior subareolar right breast, 0.3 cm from the base of the nipple and abutting the overlying skin anteriorly, there is a 0.3 cm enhancing focus, which is probably benign. Otherwise, no suspicious enhancing mass or area of non-mass enhancement is identified.  The visualized axilla is within normal limits.  LEFT BREAST:    No suspicious enhancing mass or area of non-mass enhancement is identified.  The visualized axilla is within normal limits.   EXTRAMAMMARY  FINDINGS:  There are no pathologically enlarged internal mammary chain lymph nodes on either side.    There is trace bilateral pleural fluid.  BI-RADS Category 3: Probably benign      11/14/2024 bilateral breast MRI at [facility]  FINDINGS: There is scattered fibroglandular tissue. There is mild  background parenchymal enhancement.  RIGHT BREAST:    At 5-6 o'clock in the anterior subareolar right breast, there is  redemonstration of a 0.3 cm enhancing focus, which is superficial. This  is similar to 5/7/2024. This area is below threshold for detection on  kinetic analysis. This remains probably benign.  No new suspicious enhancing mass or area of non-mass enhancement is  identified.  The visualized axilla is within normal limits.  LEFT BREAST:    No suspicious enhancing mass or area of non-mass enhancement is  identified.  The visualized axilla is within normal limits.  EXTRAMAMMARY FINDINGS:  There are no pathologically enlarged internal mammary chain lymph nodes  on either side.    IMPRESSION AND RECOMMENDATION:  1.  A probably benign 0.3 cm enhancing focus in the subareolar right  breast is not significantly changed from 5/7/2024. Recommend follow-up  contrast-enhanced breast MRI in 6 to 12 months to document continued  stability. Further management of the patient's right breast symptoms  should otherwise be based on clinical assessment.  2.  No MRI evidence of malignancy in the left breast.  BI-RADS Category 3: Probably benign    4/14/2025 bilateral screening mammogram at [facility]  BREAST  DENSITY: There are scattered areas of fibroglandular density.  There are no suspicious masses, calcifications, or areas of  architectural distortion.  IMPRESSION/RECOMMENDATION(S):  Negative mammogram showing no change from 4/11/2024 or 3/16/2023 or  8/4/2021.  Recommend annual screening mammogram in one year.  BI-RADS Category 1. Negative.    4/14/2025 breast MRI at [facility]  FINDINGS: There is scattered fibroglandular tissue. There is  minimal  background parenchymal enhancement.  RIGHT BREAST:    A punctate enhancing focus in the subareolar right breast is less  conspicuous and benign-appearing. No new suspicious enhancing mass or  area of non-mass enhancement is identified.  The visualized axilla is within normal limits.  LEFT BREAST:    No suspicious enhancing mass or area of non-mass enhancement is  identified.  The visualized axilla is within normal limits.  EXTRAMAMMARY FINDINGS:  There are no pathologically enlarged internal mammary chain lymph nodes  on either side.    IMPRESSION AND RECOMMENDATION  No MRI evidence of malignancy in either breast. Recommend annual  screening mammogram in 1 year.  BI-RADS Category 2: Benign       Review of Systems   Constitutional:  Positive for appetite change, chills, diaphoresis, fatigue, fever and unexpected weight change (loss 30lbs-unsure reason).   HENT:   Positive for hearing loss, lump/mass, mouth sores, nosebleeds, tinnitus, trouble swallowing and voice change. Negative for sore throat.    Eyes:  Positive for eye problems (blurry vision). Negative for icterus.   Respiratory:  Positive for chest tightness, shortness of breath and wheezing (laying flat and with exertion). Negative for cough and hemoptysis.    Cardiovascular:  Positive for chest pain, leg swelling and palpitations.   Gastrointestinal:  Positive for abdominal distention, abdominal pain, blood in stool, constipation, diarrhea and nausea. Negative for rectal pain and vomiting.   Endocrine: Negative for hot flashes.   Genitourinary:  Negative for bladder incontinence, difficulty urinating, dyspareunia, dysuria, frequency, hematuria, menstrual problem, nocturia, pelvic pain, vaginal bleeding and vaginal discharge.    Musculoskeletal:  Positive for arthralgias, back pain, flank pain, gait problem and myalgias. Negative for neck pain and neck stiffness.   Skin:  Negative for itching, rash and wound.   Neurological:  Positive for dizziness,  extremity weakness, gait problem, light-headedness (Recent recurrent migraines) and speech difficulty. Negative for headaches, numbness and seizures.   Hematological:  Negative for adenopathy. Bruises/bleeds easily.   Psychiatric/Behavioral:  Positive for confusion, decreased concentration (PTSD), depression and sleep disturbance. Negative for suicidal ideas. The patient is not nervous/anxious.        Medications:    Current Outpatient Medications:     albuterol sulfate  (90 Base) MCG/ACT inhaler, Inhale 2 puffs., Disp: , Rfl:     Aspirin-Acetaminophen-Caffeine (EXCEDRIN PO), Take  by mouth., Disp: , Rfl:     cetirizine (ZyrTEC Allergy) 10 MG tablet, Take 1 tablet by mouth Daily., Disp: , Rfl:     clonazePAM (KlonoPIN) 1 MG tablet, Take 1 tablet by mouth 3 (Three) Times a Day., Disp: , Rfl:     Diclofenac Sodium 1 % cream, Apply  topically to the appropriate area as directed., Disp: , Rfl:     hydroxychloroquine (PLAQUENIL) 200 MG tablet, , Disp: , Rfl:     Ibuprofen 200 MG capsule, Take 1 capsule by mouth As Needed., Disp: , Rfl:     Iron-Vitamins (GERITOL COMPLETE PO), Take  by mouth., Disp: , Rfl:     levothyroxine (SYNTHROID, LEVOTHROID) 50 MCG tablet, TAKE 1 TABLET BY MOUTH EVERY MORNING., Disp: 90 tablet, Rfl: 0    lisdexamfetamine (VYVANSE) 60 MG capsule, Take 1 capsule by mouth Every Morning, Disp: , Rfl:     Magnesium 400 MG tablet, Take 400 mg by mouth Daily., Disp: , Rfl:     pantoprazole (PROTONIX) 40 MG EC tablet, Take 1 tablet by mouth Daily., Disp: , Rfl:     polyethylene glycol (MIRALAX) 17 GM/SCOOP powder, , Disp: , Rfl:     Probiotic Product (PROBIOTIC DAILY PO), Take  by mouth., Disp: , Rfl:       Allergies   Allergen Reactions    Cinnamon Anaphylaxis    Latex Anaphylaxis    Penicillins Anaphylaxis     Beta lactam allergy details  Antibiotic reaction: unknown  Age at reaction: unknown  Dose to reaction time: unknown  Reason for antibiotic: unknown  Epinephrine required for reaction?:  unknown  Tolerated antibiotics: unknown        Vancocin Hcl [Vancomycin] Swelling    Other Nausea And Vomiting     GENERAL ANESTHESIA     Zinc Oxide Angioedema    Bee Venom Unknown - Low Severity       Past Medical History:   Diagnosis Date    Anxiety     Autonomic dysfunction     per patient    Depression     Generalized anxiety disorder     GI problem     Hypotension     Hypothyroidism     Mitral valve prolapse     PONV (postoperative nausea and vomiting)     PTSD (post-traumatic stress disorder)     Supraventricular tachycardia     Syncope     Systemic lupus erythematosus     Tricuspid valve regurgitation        Past Surgical History:   Procedure Laterality Date     SECTION      CHOLECYSTECTOMY      DILATION AND CURETTAGE, DIAGNOSTIC / THERAPEUTIC      ENDOSCOPY      x4    ENDOSCOPY N/A 2025    Procedure: ESOPHAGOGASTRODUODENOSCOPY with biopsies and #56 Gamino esophageal dilation;  Surgeon: Kris Tinsley MD;  Location: [facility] ENDOSCOPY;  Service: Gastroenterology;  Laterality: N/A;  Pre: Dysphagia  Post: Esophagitis, Gastritis, Gastric Polyp, Esophageal Ring    HYSTERECTOMY  2007    still has right ovary     KNEE ARTHROSCOPY Left     MOUTH SURGERY      THYROIDECTOMY      partial       Family History   Problem Relation Age of Onset    Aortic aneurysm Mother     Cancer Mother         Discovered at stage IV    Anxiety disorder Mother     Depression Mother     Breast cancer Maternal Aunt        Social History     Socioeconomic History    Marital status:     Number of children: 2   Tobacco Use    Smoking status: Every Day     Average packs/day: 1 pack/day for 17.0 years (17.0 ttl pk-yrs)     Types: Cigarettes     Start date:      Passive exposure: Past    Smokeless tobacco: Never   Vaping Use    Vaping status: Never Used   Substance and Sexual Activity    Alcohol use: Yes     Comment: OCC-2 every year    Drug use: Never    Sexual activity: Yes     Patient drinks 1 servings of caffeine  per day.       GYNECOLOGIC HISTORY:   G: 3. P: 2. AB: 1.  Last menstrual period: 3957-7207, sp SHANTELL/USO   Age at menarche: 10  Age at first childbirth: 22  Lactation/How lon weeks  Age at menopause: unknown  Total years of oral contraceptive use: unsure  Total years of hormone replacement therapy: 0      Objective   PHYSICAL EXAMINATION  There were no vitals filed for this visit.    ECOG 0 - Asymptomatic  General: NAD, well appearing  Psych: a&o x 3, normal mood and affect  Eyes: EOMI, no scleral icterus  ENMT: neck supple without masses or thyromegaly, mucus membranes moist  Resp: normal effort, CTAB  CV: RRR, no murmurs, no edema  GI: soft, NT, ND  MSK: normal gait, normal ROM in bilateral shoulders  Lymph nodes: no cervical, supraclavicular or axillary lymphadenopathy  Breast: symmetric, moderate size, grade 3 ptosis  Right: No visible abnormalities on inspection while seated, with arms raised or hands on hips. No masses, skin changes, or nipple abnormalities.  No discharge with palpation.  Left: No visible abnormalities on inspection while seated, with arms raised or hands on hips. No masses, skin changes, or nipple abnormalities.  No discharge with palpation.        Assessment & Plan   Assessment:   1. 49 y.o. F with a right nipple discharge, which appears to be physiologic.  2.  She has a probably benign 3 mm enhancing focus just deep to the right areola on MRI.- resolved with 4/15/2025 imaging  3.  She has right breast pain, likely related to not wearing a bra and smoking.    Discussion:  We discussed pathologic versus physiologic nipple discharge.  The discharge she describes mainly occurs after showers and when she tries to elicit it.  I could not elicit any discharge on physical exam today.  I have asked her to stop stimulating her nipple as this will often encourage physiologic discharge to persist.  I explained that many women can have dilated ducts with internal debris on imaging even without having  discharge and that this in and of itself is not a concerning finding. The 3 mm mass seen on MRI is probably incidental and unrelated to the nipple discharge.  We discussed the process of imaging surveillance.    We also discussed her right breast pain and how it is probably unrelated to the nipple discharge.  She is going to try wearing a sports bra day and night.  She also understands that her smoking is probably contributing.    Plan:  She is to get her screening mammogram in April which can be ordered by her PCP  Per her request I will see her on an as-needed basis  KELSI Morris     I spent 20 minutes caring for Gay on this date of service. This time includes time spent by me in the following activities: preparing for the visit, performing a medically appropriate examination and/or evaluation , counseling and educating the patient/family/caregiver, ordering medications, tests, or procedures, documenting information in the medical record, and independently interpreting results and communicating that information with the patient/family/caregiver.      CC:  KELSI Marlow

## 2025-04-21 ENCOUNTER — OFFICE VISIT (OUTPATIENT)
Dept: SURGERY | Facility: CLINIC | Age: 50
End: 2025-04-21
Payer: COMMERCIAL

## 2025-04-21 VITALS
HEART RATE: 90 BPM | SYSTOLIC BLOOD PRESSURE: 128 MMHG | DIASTOLIC BLOOD PRESSURE: 74 MMHG | WEIGHT: 149 LBS | BODY MASS INDEX: 22.58 KG/M2 | HEIGHT: 68 IN

## 2025-04-21 DIAGNOSIS — N64.4 BREAST PAIN: Primary | ICD-10-CM

## 2025-04-21 PROCEDURE — 99213 OFFICE O/P EST LOW 20 MIN: CPT | Performed by: NURSE PRACTITIONER

## 2025-04-21 PROCEDURE — 1159F MED LIST DOCD IN RCRD: CPT | Performed by: NURSE PRACTITIONER

## 2025-04-21 PROCEDURE — 1160F RVW MEDS BY RX/DR IN RCRD: CPT | Performed by: NURSE PRACTITIONER

## 2025-04-21 RX ORDER — MIRTAZAPINE 15 MG/1
15 TABLET, ORALLY DISINTEGRATING ORAL NIGHTLY
COMMUNITY

## 2025-04-22 ENCOUNTER — TRANSCRIBE ORDERS (OUTPATIENT)
Dept: ADMINISTRATIVE | Facility: HOSPITAL | Age: 50
End: 2025-04-22
Payer: COMMERCIAL

## 2025-04-22 DIAGNOSIS — M54.16 LUMBAR RADICULOPATHY: Primary | ICD-10-CM

## 2025-05-06 ENCOUNTER — OFFICE (OUTPATIENT)
Dept: URBAN - METROPOLITAN AREA CLINIC 76 | Facility: CLINIC | Age: 50
End: 2025-05-06
Payer: COMMERCIAL

## 2025-05-06 VITALS
HEIGHT: 68 IN | SYSTOLIC BLOOD PRESSURE: 122 MMHG | HEART RATE: 104 BPM | OXYGEN SATURATION: 97 % | WEIGHT: 154 LBS | DIASTOLIC BLOOD PRESSURE: 75 MMHG

## 2025-05-06 DIAGNOSIS — K59.00 CONSTIPATION, UNSPECIFIED: ICD-10-CM

## 2025-05-06 DIAGNOSIS — K21.9 GASTRO-ESOPHAGEAL REFLUX DISEASE WITHOUT ESOPHAGITIS: ICD-10-CM

## 2025-05-06 DIAGNOSIS — R74.8 ABNORMAL LEVELS OF OTHER SERUM ENZYMES: ICD-10-CM

## 2025-05-06 DIAGNOSIS — K58.9 IRRITABLE BOWEL SYNDROME, UNSPECIFIED: ICD-10-CM

## 2025-05-06 PROCEDURE — 99214 OFFICE O/P EST MOD 30 MIN: CPT | Performed by: INTERNAL MEDICINE

## 2025-05-07 ENCOUNTER — HOSPITAL ENCOUNTER (OUTPATIENT)
Dept: MRI IMAGING | Facility: HOSPITAL | Age: 50
Discharge: HOME | End: 2025-05-07
Admitting: ANESTHESIOLOGY
Payer: COMMERCIAL

## 2025-05-07 DIAGNOSIS — M54.16 LUMBAR RADICULOPATHY: ICD-10-CM

## 2025-05-07 PROCEDURE — 72148 MRI LUMBAR SPINE W/O DYE: CPT

## 2025-06-23 ENCOUNTER — OFFICE VISIT (OUTPATIENT)
Dept: CARDIOLOGY | Facility: CLINIC | Age: 50
End: 2025-06-23
Payer: COMMERCIAL

## 2025-06-23 VITALS
WEIGHT: 142 LBS | HEART RATE: 96 BPM | DIASTOLIC BLOOD PRESSURE: 64 MMHG | BODY MASS INDEX: 21.52 KG/M2 | SYSTOLIC BLOOD PRESSURE: 106 MMHG | HEIGHT: 68 IN

## 2025-06-23 DIAGNOSIS — R42 DIZZINESS: ICD-10-CM

## 2025-06-23 DIAGNOSIS — G90.9 DISORDER OF AUTONOMIC NERVOUS SYSTEM: Primary | ICD-10-CM

## 2025-06-23 PROBLEM — I34.1 MITRAL VALVE PROLAPSE: Chronic | Status: RESOLVED | Noted: 2021-07-09 | Resolved: 2025-06-23

## 2025-06-23 PROCEDURE — 1159F MED LIST DOCD IN RCRD: CPT | Performed by: INTERNAL MEDICINE

## 2025-06-23 PROCEDURE — 1160F RVW MEDS BY RX/DR IN RCRD: CPT | Performed by: INTERNAL MEDICINE

## 2025-06-23 PROCEDURE — 99214 OFFICE O/P EST MOD 30 MIN: CPT | Performed by: INTERNAL MEDICINE

## 2025-06-23 RX ORDER — BISACODYL 5 MG/1
5 TABLET, DELAYED RELEASE ORAL DAILY PRN
COMMUNITY

## 2025-06-23 RX ORDER — DROXIDOPA 100 MG/1
100 CAPSULE ORAL 3 TIMES DAILY
Qty: 270 CAPSULE | Refills: 2 | Status: SHIPPED | OUTPATIENT
Start: 2025-06-23

## 2025-06-23 RX ORDER — EPINEPHRINE 0.3 MG/.3ML
0.3 INJECTION SUBCUTANEOUS
COMMUNITY
Start: 2025-04-15

## 2025-06-23 RX ORDER — HYDROCODONE BITARTRATE AND ACETAMINOPHEN 5; 325 MG/1; MG/1
1 TABLET ORAL EVERY 6 HOURS PRN
COMMUNITY

## 2025-06-23 RX ORDER — PSEUDOEPHED/ACETAMINOPH/DIPHEN 30MG-500MG
TABLET ORAL
COMMUNITY
Start: 2025-05-15

## 2025-06-23 RX ORDER — TRAMADOL HYDROCHLORIDE 50 MG/1
50 TABLET ORAL
COMMUNITY
Start: 2025-04-29

## 2025-06-23 NOTE — PROGRESS NOTES
Chief Complaint  1 YR FOLLOW UP , Mitral Valve Prolapse, Autonomic dysfunction, Dizziness, Chest Pain, LOW BP , and Shortness of Breath    Subjective            Rossi Osullivan presents to [facility] CARDIOLOGY      Rossi is here for testing follow-up.  She has autonomic dysfunction and frequent dizziness.  She is here with her daughter today.  She has a multitude of symptoms.  She has dizziness upon standing chronically with presyncope.  We have referred her to the autonomic dysfunction clinic at [place] [facility] but apparently that was out of network so I think her referral was diverted to a different program in [facility] but there has been no movement really on getting her an appointment.    PMH  Past Medical History:   Diagnosis Date    Anxiety     Autonomic dysfunction     per patient    Depression     Generalized anxiety disorder     GI problem     Hypotension     Hypothyroidism     Mitral valve prolapse     PONV (postoperative nausea and vomiting)     PTSD (post-traumatic stress disorder)     Supraventricular tachycardia     Syncope     Systemic lupus erythematosus     Tricuspid valve regurgitation          SURGICALHX  Past Surgical History:   Procedure Laterality Date     SECTION      CHOLECYSTECTOMY      DILATION AND CURETTAGE, DIAGNOSTIC / THERAPEUTIC      ENDOSCOPY      x4    ENDOSCOPY N/A 2025    Procedure: ESOPHAGOGASTRODUODENOSCOPY with biopsies and #56 Gamino esophageal dilation;  Surgeon: Kris Tinsley MD;  Location: [facility] ENDOSCOPY;  Service: Gastroenterology;  Laterality: N/A;  Pre: Dysphagia  Post: Esophagitis, Gastritis, Gastric Polyp, Esophageal Ring    HYSTERECTOMY  2007    still has right ovary     KNEE ARTHROSCOPY Left     MOUTH SURGERY      THYROIDECTOMY      partial        SOC  Social History     Socioeconomic History    Marital status: Significant Other    Number of children: 2   Tobacco Use    Smoking status: Some Days     Average packs/day: 1  pack/day for 17.0 years (17.0 ttl pk-yrs)     Types: Cigarettes     Start date: 2003     Passive exposure: Past    Smokeless tobacco: Never   Vaping Use    Vaping status: Never Used   Substance and Sexual Activity    Alcohol use: Not Currently     Comment: OCC-2 every year    Drug use: Never    Sexual activity: Yes         FAMHX  Family History   Problem Relation Age of Onset    Aortic aneurysm Mother     Cancer Mother         Discovered at stage IV    Anxiety disorder Mother     Depression Mother     Breast cancer Maternal Aunt           ALLERGY  Allergies   Allergen Reactions    Cinnamon Anaphylaxis    Latex Anaphylaxis    Penicillins Anaphylaxis     Beta lactam allergy details  Antibiotic reaction: unknown  Age at reaction: unknown  Dose to reaction time: unknown  Reason for antibiotic: unknown  Epinephrine required for reaction?: unknown  Tolerated antibiotics: unknown        Vancocin Hcl [Vancomycin] Swelling    Other Nausea And Vomiting     GENERAL ANESTHESIA     Zinc Oxide Angioedema    Adhesive Tape Other (See Comments)     Other Reaction(s): Not available    Bee Venom Unknown - Low Severity        MEDSCURRENT    Current Outpatient Medications:     Acetaminophen Extra Strength 500 MG tablet, , Disp: , Rfl:     albuterol sulfate  (90 Base) MCG/ACT inhaler, Inhale 2 puffs., Disp: , Rfl:     Aspirin-Acetaminophen-Caffeine (EXCEDRIN PO), Take  by mouth., Disp: , Rfl:     bisacodyl (DULCOLAX) 5 MG EC tablet, Take 1 tablet by mouth Daily As Needed for Constipation., Disp: , Rfl:     cetirizine (ZyrTEC Allergy) 10 MG tablet, Take 1 tablet by mouth Daily., Disp: , Rfl:     clonazePAM (KlonoPIN) 1 MG tablet, Take 1 tablet by mouth 3 (Three) Times a Day., Disp: , Rfl:     Diclofenac Sodium 1 % cream, Apply  topically to the appropriate area as directed., Disp: , Rfl:     EPINEPHrine (EPIPEN) 0.3 MG/0.3ML solution auto-injector injection, Inject 0.3 mL into the appropriate muscle as directed by prescriber.,  "Disp: , Rfl:     HYDROcodone-acetaminophen (NORCO) 5-325 MG per tablet, Take 1 tablet by mouth Every 6 (Six) Hours As Needed., Disp: , Rfl:     hydroxychloroquine (PLAQUENIL) 200 MG tablet, , Disp: , Rfl:     levothyroxine (SYNTHROID, LEVOTHROID) 50 MCG tablet, TAKE 1 TABLET BY MOUTH EVERY MORNING., Disp: 90 tablet, Rfl: 0    lisdexamfetamine (VYVANSE) 60 MG capsule, Take 1 capsule by mouth Every Morning, Disp: , Rfl:     Magnesium 400 MG tablet, Take 400 mg by mouth Daily., Disp: , Rfl:     mirtazapine (REMERON SOL-TAB) 15 MG disintegrating tablet, Place 1 tablet on the tongue Every Night., Disp: , Rfl:     pantoprazole (PROTONIX) 40 MG EC tablet, Take 1 tablet by mouth Daily., Disp: , Rfl:     polyethylene glycol (MIRALAX) 17 GM/SCOOP powder, , Disp: , Rfl:     Probiotic Product (PROBIOTIC DAILY PO), Take  by mouth., Disp: , Rfl:     droxidopa (NORTHERA) 100 MG capsule capsule, Take 1 capsule by mouth 3 (Three) Times a Day., Disp: 270 capsule, Rfl: 2    Ibuprofen 200 MG capsule, Take 1 capsule by mouth As Needed. (Patient not taking: Reported on 6/23/2025), Disp: , Rfl:     Iron-Vitamins (GERITOL COMPLETE PO), Take  by mouth. (Patient not taking: Reported on 6/23/2025), Disp: , Rfl:     traMADol (ULTRAM) 50 MG tablet, Take 1 tablet by mouth. (Patient not taking: Reported on 6/23/2025), Disp: , Rfl:       Review of Systems   Constitutional: Positive for malaise/fatigue.   Eyes:  Positive for blurred vision.   Cardiovascular:  Positive for chest pain, near-syncope and palpitations.   Respiratory:  Positive for shortness of breath.    Neurological:  Positive for dizziness and light-headedness.        Objective     /64   Pulse 96   Ht 172.7 cm (68\")   Wt 64.4 kg (142 lb)   BMI 21.59 kg/m²       General Appearance:   well developed  well nourished  HENT:   oropharynx moist  lips not cyanotic  Neck:  thyroid not enlarged  supple  Respiratory:  no respiratory distress  normal breath sounds  no " rales  Cardiovascular:  no jugular venous distention  regular rhythm  apical impulse normal  S1 normal, S2 normal  no S3, no S4   no murmur  no rub, no thrill  carotid pulses normal; no bruit  pedal pulses normal  lower extremity edema: none    Musculoskeletal:  no clubbing of fingers.   normocephalic, head atraumatic  Skin:   warm, dry  Psychiatric:  Flat affect, tangential thought process      Result Review :     The following data was reviewed by: Quinn Jorge MD on 06/11/2024:    CMP          2/17/2025    14:16   CMP   Glucose 92    BUN 10    Creatinine 0.73    EGFR 101.6    Sodium 138    Potassium 3.9    Chloride 103    Calcium 9.4    Total Protein 7.5    Albumin 3.9    Globulin 3.6    Total Bilirubin 0.3    Alkaline Phosphatase 110    AST (SGOT) 21    ALT (SGPT) 20    Albumin/Globulin Ratio 1.1    BUN/Creatinine Ratio 13.7    Anion Gap 11.3      CBC          7/8/2024    11:25 2/17/2025    14:16 5/5/2025    16:10   CBC   WBC 4.13  4.68  5.69       RBC 4.40  4.22  4.36       Hemoglobin 13.8  13.3  13.7       Hematocrit 41.8  38.1  39.8       MCV 95.0  90.3  91       MCH 31.4  31.5  31.4       MCHC 33.0  34.9  34.4       RDW 13.2  12.6  12.5       Platelets 277  254  289          Details          This result is from an external source.             Lipid Panel          2/17/2025    14:16   Lipid Panel   Total Cholesterol 128    Triglycerides 51    HDL Cholesterol 37    VLDL Cholesterol 11    LDL Cholesterol  80    LDL/HDL Ratio 2.18      TSH          2/17/2025    14:16   TSH   TSH 1.270             Procedures      Rossi Osullivan  reports that she has been smoking cigarettes. She started smoking about 22 years ago. She has a 17 pack-year smoking history. She has been exposed to tobacco smoke. She has never used smokeless tobacco. I have educated her on the risk of diseases from using tobacco products such as cancer, COPD, and heart disease.     I advised her to quit and she is not willing to  quit.    I spent 3  minutes counseling the patient.                Assessment and Plan        ASSESSMENT:  Encounter Diagnoses   Name Primary?    Unspecified disorder of autonomic nervous system Yes    Dizziness          PLAN:    1.  Autonomic dysfunction-with frequent episodes of dizziness and presyncope.  With her history of tachycardia in the past I am avoiding midodrine.  We will try Northera 100 mg 3 times daily to begin with.  She is already hydrating and wears compression stockings and takes an liberal salt.  We can titrate this medication as tolerated  2.  I still recommend autonomic dysfunction clinic referral given the complexity of her case.  Otherwise she will follow-up with us in 3 months          Patient was given instructions and counseling regarding her condition or for health maintenance advice. Please see specific information pulled into the AVS if appropriate.           Quinn Jorge MD   6/23/2025  14:45 EDT

## (undated) DEVICE — SENSR O2 OXIMAX FNGR A/ 18IN NONSTR

## (undated) DEVICE — TUBING, SUCTION, 1/4" X 10', STRAIGHT: Brand: MEDLINE

## (undated) DEVICE — LN SMPL CO2 SHTRM SD STREAM W/M LUER

## (undated) DEVICE — KT ORCA ORCAPOD DISP STRL

## (undated) DEVICE — CANN O2 ETCO2 FITS ALL CONN CO2 SMPL A/ 7IN DISP LF

## (undated) DEVICE — CATH ASP DUODENAL 2.5MM 180CM

## (undated) DEVICE — ADAPT CLN BIOGUARD AIR/H2O DISP

## (undated) DEVICE — FRCP BX RADJAW4 NDL 2.8 240CM LG OG BX40

## (undated) DEVICE — BLCK/BITE BLOX W/DENTL/RIM W/STRAP 54F